# Patient Record
Sex: FEMALE | Race: WHITE | HISPANIC OR LATINO | Employment: OTHER | URBAN - METROPOLITAN AREA
[De-identification: names, ages, dates, MRNs, and addresses within clinical notes are randomized per-mention and may not be internally consistent; named-entity substitution may affect disease eponyms.]

---

## 2020-11-02 ENCOUNTER — HOSPITAL ENCOUNTER (EMERGENCY)
Facility: CLINIC | Age: 75
Discharge: HOME | End: 2020-11-03
Attending: EMERGENCY MEDICINE | Admitting: EMERGENCY MEDICINE
Payer: COMMERCIAL

## 2020-11-02 ENCOUNTER — APPOINTMENT (OUTPATIENT)
Dept: MRI IMAGING | Facility: CLINIC | Age: 75
End: 2020-11-02
Attending: EMERGENCY MEDICINE
Payer: COMMERCIAL

## 2020-11-02 VITALS
RESPIRATION RATE: 16 BRPM | HEART RATE: 91 BPM | OXYGEN SATURATION: 97 % | SYSTOLIC BLOOD PRESSURE: 144 MMHG | DIASTOLIC BLOOD PRESSURE: 74 MMHG | HEIGHT: 62 IN | WEIGHT: 160 LBS | TEMPERATURE: 98.4 F | BODY MASS INDEX: 29.44 KG/M2

## 2020-11-02 DIAGNOSIS — H81.399 PERIPHERAL VERTIGO, UNSPECIFIED LATERALITY: ICD-10-CM

## 2020-11-02 LAB
ANION GAP SERPL CALCULATED.3IONS-SCNC: 7 MMOL/L (ref 3–14)
BASOPHILS # BLD AUTO: 0 10E9/L (ref 0–0.2)
BASOPHILS NFR BLD AUTO: 0.5 %
BUN SERPL-MCNC: 8 MG/DL (ref 7–30)
CALCIUM SERPL-MCNC: 9.7 MG/DL (ref 8.5–10.1)
CHLORIDE SERPL-SCNC: 104 MMOL/L (ref 94–109)
CO2 SERPL-SCNC: 28 MMOL/L (ref 20–32)
CREAT SERPL-MCNC: 0.75 MG/DL (ref 0.52–1.04)
DIFFERENTIAL METHOD BLD: ABNORMAL
EOSINOPHIL # BLD AUTO: 0.1 10E9/L (ref 0–0.7)
EOSINOPHIL NFR BLD AUTO: 2 %
ERYTHROCYTE [DISTWIDTH] IN BLOOD BY AUTOMATED COUNT: 13.8 % (ref 10–15)
GFR SERPL CREATININE-BSD FRML MDRD: 79 ML/MIN/{1.73_M2}
GLUCOSE SERPL-MCNC: 173 MG/DL (ref 70–99)
HCT VFR BLD AUTO: 47.4 % (ref 35–47)
HGB BLD-MCNC: 14.4 G/DL (ref 11.7–15.7)
IMM GRANULOCYTES # BLD: 0 10E9/L (ref 0–0.4)
IMM GRANULOCYTES NFR BLD: 0.2 %
LYMPHOCYTES # BLD AUTO: 1.6 10E9/L (ref 0.8–5.3)
LYMPHOCYTES NFR BLD AUTO: 24.2 %
MCH RBC QN AUTO: 28 PG (ref 26.5–33)
MCHC RBC AUTO-ENTMCNC: 30.4 G/DL (ref 31.5–36.5)
MCV RBC AUTO: 92 FL (ref 78–100)
MONOCYTES # BLD AUTO: 0.4 10E9/L (ref 0–1.3)
MONOCYTES NFR BLD AUTO: 5.4 %
NEUTROPHILS # BLD AUTO: 4.4 10E9/L (ref 1.6–8.3)
NEUTROPHILS NFR BLD AUTO: 67.7 %
NRBC # BLD AUTO: 0 10*3/UL
NRBC BLD AUTO-RTO: 0 /100
PLATELET # BLD AUTO: 247 10E9/L (ref 150–450)
POTASSIUM SERPL-SCNC: 4.4 MMOL/L (ref 3.4–5.3)
RBC # BLD AUTO: 5.15 10E12/L (ref 3.8–5.2)
SODIUM SERPL-SCNC: 139 MMOL/L (ref 133–144)
TROPONIN I SERPL-MCNC: <0.015 UG/L (ref 0–0.04)
WBC # BLD AUTO: 6.5 10E9/L (ref 4–11)

## 2020-11-02 PROCEDURE — 255N000002 HC RX 255 OP 636: Performed by: EMERGENCY MEDICINE

## 2020-11-02 PROCEDURE — 85025 COMPLETE CBC W/AUTO DIFF WBC: CPT | Performed by: EMERGENCY MEDICINE

## 2020-11-02 PROCEDURE — 99285 EMERGENCY DEPT VISIT HI MDM: CPT | Mod: 25

## 2020-11-02 PROCEDURE — 84484 ASSAY OF TROPONIN QUANT: CPT | Performed by: EMERGENCY MEDICINE

## 2020-11-02 PROCEDURE — 70549 MR ANGIOGRAPH NECK W/O&W/DYE: CPT

## 2020-11-02 PROCEDURE — 70544 MR ANGIOGRAPHY HEAD W/O DYE: CPT

## 2020-11-02 PROCEDURE — 70553 MRI BRAIN STEM W/O & W/DYE: CPT

## 2020-11-02 PROCEDURE — 80048 BASIC METABOLIC PNL TOTAL CA: CPT | Performed by: EMERGENCY MEDICINE

## 2020-11-02 PROCEDURE — A9585 GADOBUTROL INJECTION: HCPCS | Performed by: EMERGENCY MEDICINE

## 2020-11-02 RX ORDER — GADOBUTROL 604.72 MG/ML
10 INJECTION INTRAVENOUS ONCE
Status: COMPLETED | OUTPATIENT
Start: 2020-11-02 | End: 2020-11-02

## 2020-11-02 RX ORDER — MECLIZINE HYDROCHLORIDE 25 MG/1
25 TABLET ORAL 3 TIMES DAILY PRN
Qty: 15 TABLET | Refills: 0 | Status: SHIPPED | OUTPATIENT
Start: 2020-11-02 | End: 2020-11-03

## 2020-11-02 RX ADMIN — GADOBUTROL 10 ML: 604.72 INJECTION INTRAVENOUS at 22:20

## 2020-11-02 ASSESSMENT — ENCOUNTER SYMPTOMS
RHINORRHEA: 0
VOMITING: 0
NAUSEA: 0
SHORTNESS OF BREATH: 0
HEADACHES: 0
DIZZINESS: 1

## 2020-11-02 ASSESSMENT — MIFFLIN-ST. JEOR: SCORE: 1184.01

## 2020-11-02 NOTE — ED AVS SNAPSHOT
[facility] Emergency Dept  [address]  [facility] [address]  Phone: [phone]  Fax: [phone]                                    Taylor Anne   MRN: [id number]    Department: [facility] Emergency Dept   Date of Visit: 11/2/2020           After Visit Summary Signature Page    I have received my discharge instructions, and my questions have been answered. I have discussed any challenges I see with this plan with the nurse or doctor.    ..........................................................................................................................................  Patient/Patient Representative Signature      ..........................................................................................................................................  Patient Representative Print Name and Relationship to Patient    ..................................................               ................................................  Date                                   Time    ..........................................................................................................................................  Reviewed by Signature/Title    ...................................................              ..............................................  Date                                               Time          22EPIC Rev 08/18

## 2020-11-03 RX ORDER — MECLIZINE HYDROCHLORIDE 25 MG/1
25 TABLET ORAL 3 TIMES DAILY PRN
Qty: 15 TABLET | Refills: 0 | Status: SHIPPED | OUTPATIENT
Start: 2020-11-03

## 2020-11-03 NOTE — ED TRIAGE NOTES
Pt presents for complaint of dizziness and a headache that has been intermittent throughout the weekend. Pt's home care nurse states that she has complained that the room is spinning around her and her blood pressures have been elevated. Pt otherwise denies other complaints. ABC intact, A&Ox4.  
no nausea/no change in level of consciousness/no blurred vision/no weakness/no dizziness/no numbness/no vomiting/no confusion/no loss of consciousness/no fever

## 2020-11-03 NOTE — ED NOTES
Spoke with DONNELL Jay, at group home and updated on pt's status. Will call her back again once results come back

## 2021-09-02 NOTE — ED PROVIDER NOTES
History     Chief Complaint:  Dizziness    History limited by: patient's broken English and expressive aphasia due to stroke.      Taylor Anne is a 75 year old female who presents with dizziness. Per report of the patient's home care nurse, the patient has had elevated blood pressure, has complained that the room in spinning and of a headache. The patient whenever she transfers from sitting to laying down she feels the room spinning but when she is laying down or sitting still the sensation starts to go away. She denies any congestion, rhinorrhea, vomiting, nausea, headache, chest pain or shortness of breath. Of note, she had a left hemisphere seizure three years ago.    Allergies:  No Known Allergies     Medications:    Miralax  Neurontin  Plavix  Lipitor  Remeron    Past Medical History:    Diabetes  Hypertension  Vitamin D deficiency  Chronic constipation  Stroke  Right hemiparesis  Onychomycosis  Osteopenia  Type 2 diabetes  Hyperlipidemia    Past Surgical History:    Cataract removal    Family History:    Brother: Stroke, Alcoholism  Mother: Hypertension, Stroke    Social History:  The patient was unaccompanied to the ED.  Smoking Status: Never Smoker  Smokeless Tobacco: Never Used  Alcohol Use: Negative  Drug Use: Negative  PCP: Iesha Walls  Marital Status:       Review of Systems   HENT: Negative for congestion and rhinorrhea.    Respiratory: Negative for shortness of breath.    Cardiovascular: Negative for chest pain.   Gastrointestinal: Negative for nausea and vomiting.   Neurological: Positive for dizziness. Negative for headaches.   See HPI all other systems negative.    Physical Exam     Patient Vitals for the past 24 hrs:   BP Temp Temp src Pulse Resp SpO2 Height Weight   11/02/20 2145 -- -- -- -- -- 99 % -- --   11/02/20 2130 (!) 144/74 -- -- 91 -- 99 % -- --   11/02/20 2115 (!) 142/78 -- -- 93 -- 98 % -- --   11/02/20 2100 139/75 -- -- 91 -- 99 % -- --   11/02/20 2045  "125/79 -- -- 96 -- 99 % -- --   11/02/20 2030 (!) 147/78 -- -- 93 -- 100 % -- --   11/02/20 2015 (!) 157/76 -- -- 95 -- 99 % -- --   11/02/20 1909 (!) 170/91 98.4  F (36.9  C) Oral 97 16 97 % 1.575 m (5' 2\") 72.6 kg (160 lb)       Physical Exam  GEN: alert    HEAD: atraumatic    EYES: pupils reactive, extraocular muscles intact, conjunctivae normal, Difficult to tell if she had horizontal nystagmus as she had problems following commands to look to the left, no vertical or rotary nystagmus noted    ENT: TMs normal as are EACs; nares patent; normal posterior pharynx and oral mucosa, fluid behind left ear drum    NECK: no posterior midline tenderness, no meningeal signs, trachea midline     RESPIRATORY: no tachypnea, breath sounds clear to auscultation    CVS: normal S1/S2, no murmurs/rubs/gallops    ABDOMEN: soft, nontender, no masses or organomegaly, no rebound, positive bowel sounds    MUSCULOSKELETAL: no deformities    SKIN: warm and dry, no acute rashes or ulceration, no erythema     NEURO: GCS 15, cranial nerves intact. Motor: hemiparetic right upper and lower extremity, no antigravity movement of right upper or lower extremity at baseline; mild contracture of right upper extremity with muscle wasting noted.  No contracture of right lower.  Bilateral lower extremity muscle wasting (patient is wheel-chair bound).    LYMPH: no lymphadenopathy    Emergency Department Course     Imaging:  Radiology findings were communicated with the patient who voiced understanding of the findings.    MR Head w/o Contrast Angiogram  HEAD MRI:   1.  No discrete mass lesion, hemorrhage or focal area suggestive of acute is ischemia.  2.  No abnormal enhancement.  3.  Mild age-related changes along with stable areas of encephalomalacia involving left cerebellar hemisphere along with the posterior left basal ganglia to left centrum semiovale region.  HEAD MRA:   1.  No discrete vessel occlusion, significant stenosis, aneurysm or high " flow vascular malformation involving the arteries of the [place].  NECK MRA:  1.  Normal configuration of the great vessels off the aortic arch with no significant stenosis of their origins.  2.  No significant stenosis or irregularity involving the arteries of the neck by NASCET criteria.  3.  Right vertebral artery dominant.  Reading per radiology    MR Neck w/o & w Contrast Angiogram  HEAD MRI:   1.  No discrete mass lesion, hemorrhage or focal area suggestive of acute is ischemia.  2.  No abnormal enhancement.  3.  Mild age-related changes along with stable areas of encephalomalacia involving left cerebellar hemisphere along with the posterior left basal ganglia to left centrum semiovale region.  HEAD MRA:   1.  No discrete vessel occlusion, significant stenosis, aneurysm or high flow vascular malformation involving the arteries of the [place].  NECK MRA:  1.  Normal configuration of the great vessels off the aortic arch with no significant stenosis of their origins.  2.  No significant stenosis or irregularity involving the arteries of the neck by NASCET criteria.  3.  Right vertebral artery dominant.  Reading per radiology    MR Brain w/o & w Contrast  HEAD MRI:   1.  No discrete mass lesion, hemorrhage or focal area suggestive of acute is ischemia.  2.  No abnormal enhancement.  3.  Mild age-related changes along with stable areas of encephalomalacia involving left cerebellar hemisphere along with the posterior left basal ganglia to left centrum semiovale region.  HEAD MRA:   1.  No discrete vessel occlusion, significant stenosis, aneurysm or high flow vascular malformation involving the arteries of the [place].  NECK MRA:  1.  Normal configuration of the great vessels off the aortic arch with no significant stenosis of their origins.  2.  No significant stenosis or irregularity involving the arteries of the neck by NASCET criteria.  3.  Right vertebral artery dominant.  Reading per  "radiology     Laboratory:  Laboratory findings were communicated with the patient who voiced understanding of the findings.    CBC: WBC 6.5, HGB 14.4,   BMP: Glucose 173 (H) o/w WNL (Creatinine 0.75)    Troponin (Collected 2028): <0.015    Emergency Department Course:    Past medical records, nursing notes, and vitals reviewed.  2103: I performed an exam of the patient and obtained history, as documented above.     IV was inserted and blood was drawn for laboratory testing, results above.    The patient was sent for a MR while in the emergency department, findings above    I rechecked and updated the patient.     I personally reviewed the laboratory and imaging results with the Patient and answered all related questions prior to discharge.     Findings and plan explained to the Patient. Patient discharged home with instructions regarding supportive care, medications, and reasons to return. The importance of close follow-up was reviewed.     Impression & Plan      Medical Decision Making:  Taylor Anne is a 75 year old female who presents to the emergency department today for evaluation of vertigo and headache.  Unfortunately the history was very difficult as the patient speaks \"broken English\" and I think she also has expressive aphasia due to a severe left hemispheric MCA several years ago.  However on exam there does not appear to be other focal deficits other than her baseline right hemiparesis.    On further evaluation it is noted that her vertigo seems to be present mainly when she transfers positions from sitting to lying or vice versa.  It resolves within seconds of laying flat.  This was not clear initially because her broken English and expressive aphasia.  She states English is her primary language    At this point I am going to treat her for benign positional vertigo with meclizine and have her follow-up with her primary care physician.          Diagnosis:    ICD-10-CM    1. Peripheral " vertigo, unspecified laterality  H81.399        Disposition:   The patient is discharged to home.    Discharge Medications:  New Prescriptions    MECLIZINE (ANTIVERT) 25 MG TABLET    Take 1 tablet (25 mg) by mouth 3 times daily as needed for dizziness       Scribe Disclosure:  I, Jovanni Rivas, am serving as a scribe at 8:29 PM on 11/2/2020 to document services personally performed by Gregorio Delcid MD based on my observations and the provider's statements to me.  [facility] EMERGENCY DEPT       Gregorio Delcid MD  11/03/20 0019     Detail Level: Simple

## 2022-11-27 ENCOUNTER — APPOINTMENT (OUTPATIENT)
Dept: MRI IMAGING | Facility: CLINIC | Age: 77
End: 2022-11-27
Attending: EMERGENCY MEDICINE
Payer: COMMERCIAL

## 2022-11-27 ENCOUNTER — HOSPITAL ENCOUNTER (EMERGENCY)
Facility: CLINIC | Age: 77
Discharge: HOME | End: 2022-11-27
Attending: EMERGENCY MEDICINE | Admitting: EMERGENCY MEDICINE
Payer: COMMERCIAL

## 2022-11-27 VITALS
HEART RATE: 93 BPM | SYSTOLIC BLOOD PRESSURE: 134 MMHG | RESPIRATION RATE: 16 BRPM | OXYGEN SATURATION: 99 % | DIASTOLIC BLOOD PRESSURE: 78 MMHG | TEMPERATURE: 98.6 F

## 2022-11-27 DIAGNOSIS — R42 VERTIGO: ICD-10-CM

## 2022-11-27 DIAGNOSIS — R51.9 NONINTRACTABLE HEADACHE, UNSPECIFIED CHRONICITY PATTERN, UNSPECIFIED HEADACHE TYPE: ICD-10-CM

## 2022-11-27 LAB
ANION GAP SERPL CALCULATED.3IONS-SCNC: 10 MMOL/L (ref 7–15)
BASOPHILS # BLD AUTO: 0 10E3/UL (ref 0–0.2)
BASOPHILS NFR BLD AUTO: 0 %
BUN SERPL-MCNC: 8.2 MG/DL (ref 8–23)
CALCIUM SERPL-MCNC: 9.6 MG/DL (ref 8.8–10.2)
CHLORIDE SERPL-SCNC: 101 MMOL/L (ref 98–107)
CREAT SERPL-MCNC: 0.61 MG/DL (ref 0.51–0.95)
DEPRECATED HCO3 PLAS-SCNC: 24 MMOL/L (ref 22–29)
EOSINOPHIL # BLD AUTO: 0.2 10E3/UL (ref 0–0.7)
EOSINOPHIL NFR BLD AUTO: 2 %
ERYTHROCYTE [DISTWIDTH] IN BLOOD BY AUTOMATED COUNT: 13.6 % (ref 10–15)
GFR SERPL CREATININE-BSD FRML MDRD: >90 ML/MIN/1.73M2
GLUCOSE SERPL-MCNC: 81 MG/DL (ref 70–99)
HCT VFR BLD AUTO: 44.1 % (ref 35–47)
HGB BLD-MCNC: 13.5 G/DL (ref 11.7–15.7)
IMM GRANULOCYTES # BLD: 0 10E3/UL
IMM GRANULOCYTES NFR BLD: 0 %
INR PPP: 0.93 (ref 0.85–1.15)
LYMPHOCYTES # BLD AUTO: 1.8 10E3/UL (ref 0.8–5.3)
LYMPHOCYTES NFR BLD AUTO: 19 %
MCH RBC QN AUTO: 28.5 PG (ref 26.5–33)
MCHC RBC AUTO-ENTMCNC: 30.6 G/DL (ref 31.5–36.5)
MCV RBC AUTO: 93 FL (ref 78–100)
MONOCYTES # BLD AUTO: 0.4 10E3/UL (ref 0–1.3)
MONOCYTES NFR BLD AUTO: 4 %
NEUTROPHILS # BLD AUTO: 7.3 10E3/UL (ref 1.6–8.3)
NEUTROPHILS NFR BLD AUTO: 75 %
NRBC # BLD AUTO: 0 10E3/UL
NRBC BLD AUTO-RTO: 0 /100
PLATELET # BLD AUTO: 249 10E3/UL (ref 150–450)
POTASSIUM SERPL-SCNC: 5.8 MMOL/L (ref 3.4–5.3)
RBC # BLD AUTO: 4.74 10E6/UL (ref 3.8–5.2)
SODIUM SERPL-SCNC: 135 MMOL/L (ref 136–145)
TROPONIN T SERPL HS-MCNC: 11 NG/L
WBC # BLD AUTO: 9.8 10E3/UL (ref 4–11)

## 2022-11-27 PROCEDURE — 70553 MRI BRAIN STEM W/O & W/DYE: CPT

## 2022-11-27 PROCEDURE — 93005 ELECTROCARDIOGRAM TRACING: CPT

## 2022-11-27 PROCEDURE — 99285 EMERGENCY DEPT VISIT HI MDM: CPT | Mod: 25

## 2022-11-27 PROCEDURE — A9585 GADOBUTROL INJECTION: HCPCS | Performed by: EMERGENCY MEDICINE

## 2022-11-27 PROCEDURE — 255N000002 HC RX 255 OP 636: Performed by: EMERGENCY MEDICINE

## 2022-11-27 PROCEDURE — 85025 COMPLETE CBC W/AUTO DIFF WBC: CPT | Performed by: EMERGENCY MEDICINE

## 2022-11-27 PROCEDURE — 84484 ASSAY OF TROPONIN QUANT: CPT | Performed by: EMERGENCY MEDICINE

## 2022-11-27 PROCEDURE — 82374 ASSAY BLOOD CARBON DIOXIDE: CPT | Performed by: EMERGENCY MEDICINE

## 2022-11-27 PROCEDURE — 85610 PROTHROMBIN TIME: CPT | Performed by: EMERGENCY MEDICINE

## 2022-11-27 PROCEDURE — 250N000013 HC RX MED GY IP 250 OP 250 PS 637: Performed by: EMERGENCY MEDICINE

## 2022-11-27 PROCEDURE — 36415 COLL VENOUS BLD VENIPUNCTURE: CPT | Performed by: EMERGENCY MEDICINE

## 2022-11-27 RX ORDER — MECLIZINE HYDROCHLORIDE 25 MG/1
25 TABLET ORAL 3 TIMES DAILY PRN
Qty: 10 TABLET | Refills: 0 | Status: SHIPPED | OUTPATIENT
Start: 2022-11-27

## 2022-11-27 RX ORDER — MECLIZINE HYDROCHLORIDE 25 MG/1
25 TABLET ORAL ONCE
Status: COMPLETED | OUTPATIENT
Start: 2022-11-27 | End: 2022-11-27

## 2022-11-27 RX ORDER — GADOBUTROL 604.72 MG/ML
7 INJECTION INTRAVENOUS ONCE
Status: COMPLETED | OUTPATIENT
Start: 2022-11-27 | End: 2022-11-27

## 2022-11-27 RX ADMIN — GADOBUTROL 7 ML: 604.72 INJECTION INTRAVENOUS at 20:27

## 2022-11-27 RX ADMIN — MECLIZINE HYDROCHLORIDE 25 MG: 25 TABLET ORAL at 19:52

## 2022-11-27 ASSESSMENT — ENCOUNTER SYMPTOMS
HEADACHES: 1
NAUSEA: 0
DIZZINESS: 1

## 2022-11-27 ASSESSMENT — ACTIVITIES OF DAILY LIVING (ADL)
ADLS_ACUITY_SCORE: 33
ADLS_ACUITY_SCORE: 35

## 2022-11-27 NOTE — ED TRIAGE NOTES
Pt complains of headache and dizziness  For 36 hours.  No n/v     Triage Assessment     Row Name 11/27/22 2619       Triage Assessment (Adult)    Airway WDL WDL       Respiratory WDL    Respiratory WDL WDL

## 2022-11-28 LAB
ATRIAL RATE - MUSE: 90 BPM
DIASTOLIC BLOOD PRESSURE - MUSE: NORMAL MMHG
INTERPRETATION ECG - MUSE: NORMAL
P AXIS - MUSE: 53 DEGREES
PR INTERVAL - MUSE: 162 MS
QRS DURATION - MUSE: 70 MS
QT - MUSE: 348 MS
QTC - MUSE: 425 MS
R AXIS - MUSE: -18 DEGREES
SYSTOLIC BLOOD PRESSURE - MUSE: NORMAL MMHG
T AXIS - MUSE: 69 DEGREES
VENTRICULAR RATE- MUSE: 90 BPM

## 2022-11-28 NOTE — ED PROVIDER NOTES
PIT/Triage Evaluation    Taylor Anne is a 77 year old female on Plavix (according an office visit med refill on 11/15/22) for history of CVA with diabetes mellitus who presents with an intermittent headache accompanied by dizziness she describes as room spinning for the past two days. Upon evaluation, she has no headache, but her vertigo is present. She denies vision or speech changes. She denies nausea or vomiting. She has never had issues with vertigo before.    Exam is notable for well-appearing [place] woman sitting in her own wheelchair.  No facial droop.  Paralysis of the right upper extremity at baseline.  Normal strength of the left upper extremity.  Grossly normal strength of the bilateral lower extremities.    Appropriate interventions for symptom management were initiated if applicable.  Appropriate diagnostic tests were initiated if indicated.    Important information for subsequent clinician: MRI ordered for elderly woman with risk factors and room spinning vertigo.  Prior stroke.  On Plavix.  EKG reassuring.  Labs ordered.  Meclizine for symptoms.    I briefly evaluated the patient and developed an initial plan of care. I discussed this plan and explained that this brief interaction does not constitute a full evaluation. Patient/family understands that they should wait to be fully evaluated and discuss any test results with another clinician prior to leaving the hospital.       Caridad Farfan MD  11/27/22 1930

## 2022-11-28 NOTE — ED PROVIDER NOTES
History   Chief Complaint:  Headache and Dizziness     The history is provided by the patient.      Taylor Anne is a 77 year old female with history of hypertension, hyperlipidemia, DM2, and stroke who presents with headache and dizziness. Patient reports experiencing spinning dizziness for the last 3 days. She also endorses a headache. She denies nausea. She reports being seen in the Urgent Care this morning and referred to the ED.     Review of Systems   Gastrointestinal: Negative for nausea.   Neurological: Positive for dizziness and headaches.   All other systems reviewed and are negative.      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    Mouth/Throat: Oropharynx is clear, mucous membranes are moist  Eyes: EOM are normal. Pupils are equal, round, and reactive to light.   CV: Regular rate and rhythm, no murmurs, rubs or gallops.  Chest: Effort normal and breath sounds normal.   GI: No distension. There is no tenderness  MSK: Normal range of motion.   Neurological:   A/Ox3;   Cranial nerves 2-12 intact;   5/5 strength throughout the upper and lower  extremities;   sensation intact to light touch throughout the upper and lower extremities;   normal gait   Skin: Skin is warm and dry.        Emergency Department Course   ECG  ECG results from 11/27/22   EKG 12-lead, tracing only     Value    Systolic Blood Pressure     Diastolic Blood Pressure     Ventricular Rate 90    Atrial Rate 90    OR Interval 162    QRS Duration 70        QTc 425    P Axis 53    R AXIS -18    T Axis 69    Interpretation ECG      Sinus rhythm  Low voltage QRS  Borderline ECG  No previous ECGs available         Imaging:  MR Brain w/o & w Contrast   Final Result   IMPRESSION:   1.  No acute intracranial process.   2.  Chronic findings are not significantly changed since 11/02/2020.        Report per radiology    Laboratory:  Labs Ordered and Resulted from Time of ED Arrival to Time of ED Departure   BASIC METABOLIC PANEL - Abnormal       Result Value    Sodium 135 (*)     Potassium 5.8 (*)     Chloride 101      Carbon Dioxide (CO2) 24      Anion Gap 10      Urea Nitrogen 8.2      Creatinine 0.61      Calcium 9.6      Glucose 81      GFR Estimate >90     CBC WITH PLATELETS AND DIFFERENTIAL - Abnormal    WBC Count 9.8      RBC Count 4.74      Hemoglobin 13.5      Hematocrit 44.1      MCV 93      MCH 28.5      MCHC 30.6 (*)     RDW 13.6      Platelet Count 249      % Neutrophils 75      % Lymphocytes 19      % Monocytes 4      % Eosinophils 2      % Basophils 0      % Immature Granulocytes 0      NRBCs per 100 WBC 0      Absolute Neutrophils 7.3      Absolute Lymphocytes 1.8      Absolute Monocytes 0.4      Absolute Eosinophils 0.2      Absolute Basophils 0.0      Absolute Immature Granulocytes 0.0      Absolute NRBCs 0.0     TROPONIN T, HIGH SENSITIVITY - Normal    Troponin T, High Sensitivity 11     INR - Normal    INR 0.93        Emergency Department Course:     Reviewed:  I reviewed nursing notes, vitals, past medical history and Care Everywhere    Assessments:  2148 I obtained history  and examined the patient as noted above.     Interventions:  1952 Meclizine 25mg PO  2027 Gadobutrol 7mL IV    Disposition:  The patient was discharged to home.     Impression & Plan   Medical Decision Making:  This is a 77-year-old female with history of hypertension, hyperlipidemia, diabetes, stroke, who presents relation headache and dizziness.  Given persistent symptoms, headache, and multiple risk factors, the concern exist for possible cerebellar stroke.  Screening labs show no hematologic or metabolic abnormality to explain symptoms.  Fortunately, MRI brain is negative for acute pathology.  No fever or thunderclap to indicate concern for meningitis or subarachnoid hemorrhage, or to indicate LP.  Plan For supportive cares at home, likely peripheral vertigo.  Return precautions for intractable dizziness, headache, neck pain, or any other concerns.  Primary care follow-up in 3 to 5 days.    Diagnosis:    ICD-10-CM    1. Vertigo  R42       2. Nonintractable headache, unspecified chronicity pattern, unspecified headache type  R51.9           Discharge Medications:  New Prescriptions    MECLIZINE (ANTIVERT) 25 MG TABLET    Take 1 tablet (25 mg) by mouth 3 times daily as needed for dizziness       Scribe Disclosure:  I, Diane Georgi, am serving as a scribe at 9:39 PM on 11/27/2022 to document services personally performed by Phan Horn MD based on my observations and the provider's statements to me.          Phan Horn MD  01/19/23 1139

## 2023-12-28 ENCOUNTER — APPOINTMENT (OUTPATIENT)
Dept: GENERAL RADIOLOGY | Facility: CLINIC | Age: 78
DRG: 853 | End: 2023-12-28
Attending: EMERGENCY MEDICINE
Payer: COMMERCIAL

## 2023-12-28 ENCOUNTER — ANESTHESIA (OUTPATIENT)
Dept: SURGERY | Facility: CLINIC | Age: 78
DRG: 853 | End: 2023-12-28
Payer: COMMERCIAL

## 2023-12-28 ENCOUNTER — HOSPITAL ENCOUNTER (INPATIENT)
Facility: CLINIC | Age: 78
LOS: 9 days | Discharge: NURSING, REHAB OR LONG-TERM CARE | DRG: 853 | End: 2024-01-06
Attending: EMERGENCY MEDICINE | Admitting: INTERNAL MEDICINE
Payer: COMMERCIAL

## 2023-12-28 ENCOUNTER — APPOINTMENT (OUTPATIENT)
Dept: CT IMAGING | Facility: CLINIC | Age: 78
DRG: 853 | End: 2023-12-28
Attending: EMERGENCY MEDICINE
Payer: COMMERCIAL

## 2023-12-28 ENCOUNTER — ANESTHESIA EVENT (OUTPATIENT)
Dept: SURGERY | Facility: CLINIC | Age: 78
DRG: 853 | End: 2023-12-28
Payer: COMMERCIAL

## 2023-12-28 DIAGNOSIS — K83.09 ASCENDING CHOLANGITIS (H): ICD-10-CM

## 2023-12-28 DIAGNOSIS — K85.10 GALLSTONE PANCREATITIS: ICD-10-CM

## 2023-12-28 DIAGNOSIS — R65.21 SEPTIC SHOCK (H): ICD-10-CM

## 2023-12-28 DIAGNOSIS — A41.9 SEPTIC SHOCK (H): ICD-10-CM

## 2023-12-28 DIAGNOSIS — K80.50 CHOLEDOCHOLITHIASIS: ICD-10-CM

## 2023-12-28 DIAGNOSIS — F32.1 CURRENT MODERATE EPISODE OF MAJOR DEPRESSIVE DISORDER WITHOUT PRIOR EPISODE (H): Primary | ICD-10-CM

## 2023-12-28 LAB
ALBUMIN SERPL BCG-MCNC: 4.2 G/DL (ref 3.5–5.2)
ALBUMIN UR-MCNC: 20 MG/DL
ALP SERPL-CCNC: 206 U/L (ref 40–150)
ALT SERPL W P-5'-P-CCNC: 780 U/L (ref 0–50)
AMMONIA PLAS-SCNC: 13 UMOL/L (ref 11–51)
ANION GAP SERPL CALCULATED.3IONS-SCNC: 17 MMOL/L (ref 7–15)
APPEARANCE UR: CLEAR
AST SERPL W P-5'-P-CCNC: 582 U/L (ref 0–45)
B-OH-BUTYR SERPL-SCNC: 0.3 MMOL/L
BACTERIA #/AREA URNS HPF: ABNORMAL /HPF
BASOPHILS # BLD AUTO: 0 10E3/UL (ref 0–0.2)
BASOPHILS NFR BLD AUTO: 0 %
BILIRUB SERPL-MCNC: 4.1 MG/DL
BILIRUB UR QL STRIP: NEGATIVE
BUN SERPL-MCNC: 20.4 MG/DL (ref 8–23)
CALCIUM SERPL-MCNC: 9.5 MG/DL (ref 8.8–10.2)
CHLORIDE SERPL-SCNC: 92 MMOL/L (ref 98–107)
COLOR UR AUTO: YELLOW
CREAT SERPL-MCNC: 1.01 MG/DL (ref 0.51–0.95)
D DIMER PPP FEU-MCNC: 12.68 UG/ML FEU (ref 0–0.5)
DEPRECATED HCO3 PLAS-SCNC: 22 MMOL/L (ref 22–29)
EGFRCR SERPLBLD CKD-EPI 2021: 57 ML/MIN/1.73M2
EOSINOPHIL # BLD AUTO: 0 10E3/UL (ref 0–0.7)
EOSINOPHIL NFR BLD AUTO: 0 %
ERCP: NORMAL
ERYTHROCYTE [DISTWIDTH] IN BLOOD BY AUTOMATED COUNT: 13.8 % (ref 10–15)
ETHANOL SERPL-MCNC: <0.01 G/DL
FLUAV RNA SPEC QL NAA+PROBE: NEGATIVE
FLUBV RNA RESP QL NAA+PROBE: NEGATIVE
GLUCOSE BLDC GLUCOMTR-MCNC: 102 MG/DL (ref 70–99)
GLUCOSE BLDC GLUCOMTR-MCNC: 168 MG/DL (ref 70–99)
GLUCOSE BLDC GLUCOMTR-MCNC: 83 MG/DL (ref 70–99)
GLUCOSE SERPL-MCNC: 181 MG/DL (ref 70–99)
GLUCOSE UR STRIP-MCNC: NEGATIVE MG/DL
HBA1C MFR BLD: 6.3 %
HCO3 BLDV-SCNC: 23 MMOL/L (ref 21–28)
HCO3 BLDV-SCNC: 24 MMOL/L (ref 21–28)
HCT VFR BLD AUTO: 42.7 % (ref 35–47)
HGB BLD-MCNC: 14.2 G/DL (ref 11.7–15.7)
HGB UR QL STRIP: ABNORMAL
HOLD SPECIMEN: NORMAL
IMM GRANULOCYTES # BLD: 0.3 10E3/UL
IMM GRANULOCYTES NFR BLD: 2 %
KETONES UR STRIP-MCNC: NEGATIVE MG/DL
LACTATE BLD-SCNC: 4 MMOL/L
LACTATE BLD-SCNC: 4.2 MMOL/L
LACTATE SERPL-SCNC: 4 MMOL/L (ref 0.7–2)
LEUKOCYTE ESTERASE UR QL STRIP: NEGATIVE
LIPASE SERPL-CCNC: 1548 U/L (ref 13–60)
LYMPHOCYTES # BLD AUTO: 0.4 10E3/UL (ref 0.8–5.3)
LYMPHOCYTES NFR BLD AUTO: 2 %
MAGNESIUM SERPL-MCNC: 1.6 MG/DL (ref 1.7–2.3)
MCH RBC QN AUTO: 29 PG (ref 26.5–33)
MCHC RBC AUTO-ENTMCNC: 33.3 G/DL (ref 31.5–36.5)
MCV RBC AUTO: 87 FL (ref 78–100)
MONOCYTES # BLD AUTO: 0.3 10E3/UL (ref 0–1.3)
MONOCYTES NFR BLD AUTO: 2 %
NEUTROPHILS # BLD AUTO: 15.1 10E3/UL (ref 1.6–8.3)
NEUTROPHILS NFR BLD AUTO: 94 %
NITRATE UR QL: NEGATIVE
NRBC # BLD AUTO: 0 10E3/UL
NRBC BLD AUTO-RTO: 0 /100
PCO2 BLDV: 37 MM HG (ref 40–50)
PCO2 BLDV: 41 MM HG (ref 40–50)
PH BLDV: 7.35 [PH] (ref 7.32–7.43)
PH BLDV: 7.42 [PH] (ref 7.32–7.43)
PH UR STRIP: 7 [PH] (ref 5–7)
PLATELET # BLD AUTO: 263 10E3/UL (ref 150–450)
PO2 BLDV: 15 MM HG (ref 25–47)
PO2 BLDV: 20 MM HG (ref 25–47)
POTASSIUM SERPL-SCNC: 4.5 MMOL/L (ref 3.4–5.3)
PROCALCITONIN SERPL IA-MCNC: 2.59 NG/ML
PROT SERPL-MCNC: 7.9 G/DL (ref 6.4–8.3)
RBC # BLD AUTO: 4.89 10E6/UL (ref 3.8–5.2)
RBC URINE: 2 /HPF
RSV RNA SPEC NAA+PROBE: NEGATIVE
SAO2 % BLDV: 18 % (ref 94–100)
SAO2 % BLDV: 35 % (ref 94–100)
SARS-COV-2 RNA RESP QL NAA+PROBE: NEGATIVE
SODIUM SERPL-SCNC: 131 MMOL/L (ref 135–145)
SP GR UR STRIP: 1.01 (ref 1–1.03)
UROBILINOGEN UR STRIP-MCNC: 2 MG/DL
WBC # BLD AUTO: 16 10E3/UL (ref 4–11)
WBC URINE: 6 /HPF

## 2023-12-28 PROCEDURE — 85025 COMPLETE CBC W/AUTO DIFF WBC: CPT | Performed by: EMERGENCY MEDICINE

## 2023-12-28 PROCEDURE — 258N000003 HC RX IP 258 OP 636: Performed by: INTERNAL MEDICINE

## 2023-12-28 PROCEDURE — 83036 HEMOGLOBIN GLYCOSYLATED A1C: CPT | Performed by: INTERNAL MEDICINE

## 2023-12-28 PROCEDURE — 82140 ASSAY OF AMMONIA: CPT | Performed by: EMERGENCY MEDICINE

## 2023-12-28 PROCEDURE — 272N000001 HC OR GENERAL SUPPLY STERILE: Performed by: INTERNAL MEDICINE

## 2023-12-28 PROCEDURE — 250N000013 HC RX MED GY IP 250 OP 250 PS 637: Performed by: INTERNAL MEDICINE

## 2023-12-28 PROCEDURE — 250N000009 HC RX 250: Performed by: INTERNAL MEDICINE

## 2023-12-28 PROCEDURE — 250N000011 HC RX IP 250 OP 636: Performed by: EMERGENCY MEDICINE

## 2023-12-28 PROCEDURE — 250N000025 HC SEVOFLURANE, PER MIN: Performed by: INTERNAL MEDICINE

## 2023-12-28 PROCEDURE — 250N000009 HC RX 250: Performed by: NURSE ANESTHETIST, CERTIFIED REGISTERED

## 2023-12-28 PROCEDURE — 99223 1ST HOSP IP/OBS HIGH 75: CPT | Performed by: INTERNAL MEDICINE

## 2023-12-28 PROCEDURE — 84145 PROCALCITONIN (PCT): CPT | Performed by: EMERGENCY MEDICINE

## 2023-12-28 PROCEDURE — 370N000017 HC ANESTHESIA TECHNICAL FEE, PER MIN: Performed by: INTERNAL MEDICINE

## 2023-12-28 PROCEDURE — 93005 ELECTROCARDIOGRAM TRACING: CPT

## 2023-12-28 PROCEDURE — 250N000009 HC RX 250: Performed by: EMERGENCY MEDICINE

## 2023-12-28 PROCEDURE — 82962 GLUCOSE BLOOD TEST: CPT

## 2023-12-28 PROCEDURE — C1769 GUIDE WIRE: HCPCS | Performed by: INTERNAL MEDICINE

## 2023-12-28 PROCEDURE — 87186 SC STD MICRODIL/AGAR DIL: CPT | Performed by: EMERGENCY MEDICINE

## 2023-12-28 PROCEDURE — 36415 COLL VENOUS BLD VENIPUNCTURE: CPT

## 2023-12-28 PROCEDURE — 87149 DNA/RNA DIRECT PROBE: CPT | Performed by: EMERGENCY MEDICINE

## 2023-12-28 PROCEDURE — 71275 CT ANGIOGRAPHY CHEST: CPT

## 2023-12-28 PROCEDURE — 82010 KETONE BODYS QUAN: CPT | Performed by: EMERGENCY MEDICINE

## 2023-12-28 PROCEDURE — 83735 ASSAY OF MAGNESIUM: CPT | Performed by: EMERGENCY MEDICINE

## 2023-12-28 PROCEDURE — 99291 CRITICAL CARE FIRST HOUR: CPT | Mod: 25

## 2023-12-28 PROCEDURE — 82803 BLOOD GASES ANY COMBINATION: CPT

## 2023-12-28 PROCEDURE — 255N000002 HC RX 255 OP 636: Performed by: INTERNAL MEDICINE

## 2023-12-28 PROCEDURE — 83605 ASSAY OF LACTIC ACID: CPT

## 2023-12-28 PROCEDURE — 0F798DZ DILATION OF COMMON BILE DUCT WITH INTRALUMINAL DEVICE, VIA NATURAL OR ARTIFICIAL OPENING ENDOSCOPIC: ICD-10-PCS | Performed by: INTERNAL MEDICINE

## 2023-12-28 PROCEDURE — C2617 STENT, NON-COR, TEM W/O DEL: HCPCS | Performed by: INTERNAL MEDICINE

## 2023-12-28 PROCEDURE — C1894 INTRO/SHEATH, NON-LASER: HCPCS | Performed by: INTERNAL MEDICINE

## 2023-12-28 PROCEDURE — 83690 ASSAY OF LIPASE: CPT | Performed by: EMERGENCY MEDICINE

## 2023-12-28 PROCEDURE — 999N000141 HC STATISTIC PRE-PROCEDURE NURSING ASSESSMENT: Performed by: INTERNAL MEDICINE

## 2023-12-28 PROCEDURE — 36415 COLL VENOUS BLD VENIPUNCTURE: CPT | Performed by: EMERGENCY MEDICINE

## 2023-12-28 PROCEDURE — 82077 ASSAY SPEC XCP UR&BREATH IA: CPT | Performed by: EMERGENCY MEDICINE

## 2023-12-28 PROCEDURE — 258N000003 HC RX IP 258 OP 636: Performed by: EMERGENCY MEDICINE

## 2023-12-28 PROCEDURE — 360N000082 HC SURGERY LEVEL 2 W/ FLUORO, PER MIN: Performed by: INTERNAL MEDICINE

## 2023-12-28 PROCEDURE — 258N000003 HC RX IP 258 OP 636: Performed by: NURSE ANESTHETIST, CERTIFIED REGISTERED

## 2023-12-28 PROCEDURE — 80053 COMPREHEN METABOLIC PANEL: CPT | Performed by: EMERGENCY MEDICINE

## 2023-12-28 PROCEDURE — 250N000011 HC RX IP 250 OP 636: Performed by: NURSE ANESTHETIST, CERTIFIED REGISTERED

## 2023-12-28 PROCEDURE — 250N000011 HC RX IP 250 OP 636: Performed by: INTERNAL MEDICINE

## 2023-12-28 PROCEDURE — 87637 SARSCOV2&INF A&B&RSV AMP PRB: CPT | Performed by: EMERGENCY MEDICINE

## 2023-12-28 PROCEDURE — 999N000179 XR SURGERY CARM FLUORO LESS THAN 5 MIN W STILLS: Mod: TC

## 2023-12-28 PROCEDURE — 200N000001 HC R&B ICU

## 2023-12-28 PROCEDURE — 81001 URINALYSIS AUTO W/SCOPE: CPT | Performed by: EMERGENCY MEDICINE

## 2023-12-28 PROCEDURE — 93005 ELECTROCARDIOGRAM TRACING: CPT | Mod: 76

## 2023-12-28 PROCEDURE — 85379 FIBRIN DEGRADATION QUANT: CPT | Performed by: EMERGENCY MEDICINE

## 2023-12-28 DEVICE — STENT COTTON LEUNG (AMSTERDAM) BIL 10FRX07CM CLSO-10-7: Type: IMPLANTABLE DEVICE | Site: BILE DUCT | Status: FUNCTIONAL

## 2023-12-28 RX ORDER — ALBUTEROL SULFATE 0.83 MG/ML
2.5 SOLUTION RESPIRATORY (INHALATION) EVERY 4 HOURS PRN
Status: DISCONTINUED | OUTPATIENT
Start: 2023-12-28 | End: 2024-01-01 | Stop reason: HOSPADM

## 2023-12-28 RX ORDER — ONDANSETRON 4 MG/1
4 TABLET, ORALLY DISINTEGRATING ORAL EVERY 30 MIN PRN
Status: CANCELLED | OUTPATIENT
Start: 2023-12-28

## 2023-12-28 RX ORDER — PROCHLORPERAZINE MALEATE 5 MG
5 TABLET ORAL EVERY 6 HOURS PRN
Status: DISCONTINUED | OUTPATIENT
Start: 2023-12-28 | End: 2024-01-06 | Stop reason: HOSPADM

## 2023-12-28 RX ORDER — ONDANSETRON 4 MG/1
4 TABLET, ORALLY DISINTEGRATING ORAL EVERY 6 HOURS PRN
Status: CANCELLED | OUTPATIENT
Start: 2023-12-28

## 2023-12-28 RX ORDER — CLOPIDOGREL BISULFATE 75 MG/1
75 TABLET ORAL DAILY
COMMUNITY

## 2023-12-28 RX ORDER — ROPIVACAINE IN 0.9% SOD CHL/PF 0.1 %
.03-.125 PLASTIC BAG, INJECTION (ML) EPIDURAL CONTINUOUS
Status: DISCONTINUED | OUTPATIENT
Start: 2023-12-28 | End: 2023-12-29

## 2023-12-28 RX ORDER — ONDANSETRON 4 MG/1
4 TABLET, ORALLY DISINTEGRATING ORAL EVERY 6 HOURS PRN
Status: DISCONTINUED | OUTPATIENT
Start: 2023-12-28 | End: 2024-01-06 | Stop reason: HOSPADM

## 2023-12-28 RX ORDER — MIRTAZAPINE 15 MG/1
30 TABLET, FILM COATED ORAL AT BEDTIME
Status: DISCONTINUED | OUTPATIENT
Start: 2023-12-28 | End: 2024-01-06 | Stop reason: HOSPADM

## 2023-12-28 RX ORDER — DOCUSATE SODIUM 100 MG/1
100 CAPSULE, LIQUID FILLED ORAL 2 TIMES DAILY PRN
COMMUNITY

## 2023-12-28 RX ORDER — NICOTINE POLACRILEX 4 MG
15-30 LOZENGE BUCCAL
Status: DISCONTINUED | OUTPATIENT
Start: 2023-12-28 | End: 2023-12-28

## 2023-12-28 RX ORDER — SODIUM CHLORIDE 9 MG/ML
INJECTION, SOLUTION INTRAVENOUS CONTINUOUS
Status: DISCONTINUED | OUTPATIENT
Start: 2023-12-28 | End: 2023-12-30

## 2023-12-28 RX ORDER — PROCHLORPERAZINE 25 MG
12.5 SUPPOSITORY, RECTAL RECTAL EVERY 12 HOURS PRN
Status: DISCONTINUED | OUTPATIENT
Start: 2023-12-28 | End: 2024-01-06 | Stop reason: HOSPADM

## 2023-12-28 RX ORDER — NALOXONE HYDROCHLORIDE 0.4 MG/ML
0.2 INJECTION, SOLUTION INTRAMUSCULAR; INTRAVENOUS; SUBCUTANEOUS
Status: CANCELLED | OUTPATIENT
Start: 2023-12-28

## 2023-12-28 RX ORDER — TRAVOPROST OPHTHALMIC SOLUTION 0.04 MG/ML
1 SOLUTION OPHTHALMIC AT BEDTIME
Status: DISCONTINUED | OUTPATIENT
Start: 2023-12-28 | End: 2024-01-06 | Stop reason: HOSPADM

## 2023-12-28 RX ORDER — DIPHENHYDRAMINE HCL 12.5MG/5ML
12.5 LIQUID (ML) ORAL EVERY 6 HOURS PRN
Status: DISCONTINUED | OUTPATIENT
Start: 2023-12-28 | End: 2023-12-28

## 2023-12-28 RX ORDER — FENTANYL CITRATE 50 UG/ML
INJECTION, SOLUTION INTRAMUSCULAR; INTRAVENOUS PRN
Status: DISCONTINUED | OUTPATIENT
Start: 2023-12-28 | End: 2023-12-28

## 2023-12-28 RX ORDER — LABETALOL HYDROCHLORIDE 5 MG/ML
10 INJECTION, SOLUTION INTRAVENOUS
Status: DISCONTINUED | OUTPATIENT
Start: 2023-12-28 | End: 2023-12-28

## 2023-12-28 RX ORDER — ONDANSETRON 2 MG/ML
4 INJECTION INTRAMUSCULAR; INTRAVENOUS EVERY 6 HOURS PRN
Status: DISCONTINUED | OUTPATIENT
Start: 2023-12-28 | End: 2024-01-06 | Stop reason: HOSPADM

## 2023-12-28 RX ORDER — TRAVOPROST OPHTHALMIC SOLUTION 0.04 MG/ML
1 SOLUTION OPHTHALMIC AT BEDTIME
COMMUNITY

## 2023-12-28 RX ORDER — SODIUM CHLORIDE, SODIUM LACTATE, POTASSIUM CHLORIDE, CALCIUM CHLORIDE 600; 310; 30; 20 MG/100ML; MG/100ML; MG/100ML; MG/100ML
INJECTION, SOLUTION INTRAVENOUS CONTINUOUS
Status: DISCONTINUED | OUTPATIENT
Start: 2023-12-28 | End: 2023-12-28

## 2023-12-28 RX ORDER — SODIUM CHLORIDE, SODIUM LACTATE, POTASSIUM CHLORIDE, CALCIUM CHLORIDE 600; 310; 30; 20 MG/100ML; MG/100ML; MG/100ML; MG/100ML
INJECTION, SOLUTION INTRAVENOUS CONTINUOUS PRN
Status: DISCONTINUED | OUTPATIENT
Start: 2023-12-28 | End: 2023-12-28

## 2023-12-28 RX ORDER — NALOXONE HYDROCHLORIDE 0.4 MG/ML
0.4 INJECTION, SOLUTION INTRAMUSCULAR; INTRAVENOUS; SUBCUTANEOUS
Status: CANCELLED | OUTPATIENT
Start: 2023-12-28

## 2023-12-28 RX ORDER — LIDOCAINE 40 MG/G
CREAM TOPICAL
Status: DISCONTINUED | OUTPATIENT
Start: 2023-12-28 | End: 2023-12-28 | Stop reason: HOSPADM

## 2023-12-28 RX ORDER — DEXTROSE MONOHYDRATE 25 G/50ML
25-50 INJECTION, SOLUTION INTRAVENOUS
Status: DISCONTINUED | OUTPATIENT
Start: 2023-12-28 | End: 2023-12-28

## 2023-12-28 RX ORDER — NYSTATIN 100000 [USP'U]/G
POWDER TOPICAL 2 TIMES DAILY
COMMUNITY

## 2023-12-28 RX ORDER — ACETAMINOPHEN 325 MG/1
1-2 TABLET ORAL EVERY 4 HOURS PRN
COMMUNITY

## 2023-12-28 RX ORDER — HYDRALAZINE HYDROCHLORIDE 20 MG/ML
5-10 INJECTION INTRAMUSCULAR; INTRAVENOUS EVERY 10 MIN PRN
Status: DISCONTINUED | OUTPATIENT
Start: 2023-12-28 | End: 2023-12-28

## 2023-12-28 RX ORDER — GABAPENTIN 300 MG/1
300 CAPSULE ORAL AT BEDTIME
Status: DISCONTINUED | OUTPATIENT
Start: 2023-12-28 | End: 2024-01-06 | Stop reason: HOSPADM

## 2023-12-28 RX ORDER — HYDROMORPHONE HCL IN WATER/PF 6 MG/30 ML
0.4 PATIENT CONTROLLED ANALGESIA SYRINGE INTRAVENOUS EVERY 5 MIN PRN
Status: DISCONTINUED | OUTPATIENT
Start: 2023-12-28 | End: 2023-12-28

## 2023-12-28 RX ORDER — DEXTROSE MONOHYDRATE 25 G/50ML
25-50 INJECTION, SOLUTION INTRAVENOUS
Status: DISCONTINUED | OUTPATIENT
Start: 2023-12-28 | End: 2024-01-06 | Stop reason: HOSPADM

## 2023-12-28 RX ORDER — DIPHENHYDRAMINE HYDROCHLORIDE 50 MG/ML
12.5 INJECTION INTRAMUSCULAR; INTRAVENOUS EVERY 6 HOURS PRN
Status: DISCONTINUED | OUTPATIENT
Start: 2023-12-28 | End: 2023-12-28

## 2023-12-28 RX ORDER — OMEGA-3 FATTY ACIDS/FISH OIL 300-1000MG
1 CAPSULE ORAL DAILY
COMMUNITY

## 2023-12-28 RX ORDER — HYDROMORPHONE HYDROCHLORIDE 1 MG/ML
0.5 INJECTION, SOLUTION INTRAMUSCULAR; INTRAVENOUS; SUBCUTANEOUS EVERY 5 MIN PRN
Status: DISCONTINUED | OUTPATIENT
Start: 2023-12-28 | End: 2023-12-28

## 2023-12-28 RX ORDER — OXYCODONE HYDROCHLORIDE 5 MG/1
5 TABLET ORAL
Status: CANCELLED | OUTPATIENT
Start: 2023-12-28

## 2023-12-28 RX ORDER — NICOTINE POLACRILEX 4 MG
15-30 LOZENGE BUCCAL
Status: DISCONTINUED | OUTPATIENT
Start: 2023-12-28 | End: 2024-01-06 | Stop reason: HOSPADM

## 2023-12-28 RX ORDER — IOPAMIDOL 755 MG/ML
500 INJECTION, SOLUTION INTRAVASCULAR ONCE
Status: COMPLETED | OUTPATIENT
Start: 2023-12-28 | End: 2023-12-28

## 2023-12-28 RX ORDER — ONDANSETRON 2 MG/ML
4 INJECTION INTRAMUSCULAR; INTRAVENOUS EVERY 6 HOURS PRN
Status: CANCELLED | OUTPATIENT
Start: 2023-12-28

## 2023-12-28 RX ORDER — OXYCODONE HYDROCHLORIDE 5 MG/1
10 TABLET ORAL
Status: CANCELLED | OUTPATIENT
Start: 2023-12-28

## 2023-12-28 RX ORDER — PIPERACILLIN SODIUM, TAZOBACTAM SODIUM 4; .5 G/20ML; G/20ML
4.5 INJECTION, POWDER, LYOPHILIZED, FOR SOLUTION INTRAVENOUS ONCE
Status: COMPLETED | OUTPATIENT
Start: 2023-12-28 | End: 2023-12-28

## 2023-12-28 RX ORDER — LOSARTAN POTASSIUM 25 MG/1
25 TABLET ORAL DAILY
Status: DISCONTINUED | OUTPATIENT
Start: 2023-12-29 | End: 2024-01-06 | Stop reason: HOSPADM

## 2023-12-28 RX ORDER — ETOMIDATE 2 MG/ML
INJECTION INTRAVENOUS PRN
Status: DISCONTINUED | OUTPATIENT
Start: 2023-12-28 | End: 2023-12-28

## 2023-12-28 RX ORDER — ATORVASTATIN CALCIUM 40 MG/1
80 TABLET, FILM COATED ORAL AT BEDTIME
Status: DISCONTINUED | OUTPATIENT
Start: 2023-12-28 | End: 2023-12-28

## 2023-12-28 RX ORDER — GABAPENTIN 300 MG/1
300 CAPSULE ORAL AT BEDTIME
COMMUNITY

## 2023-12-28 RX ORDER — MIRTAZAPINE 30 MG/1
30 TABLET, FILM COATED ORAL AT BEDTIME
COMMUNITY

## 2023-12-28 RX ORDER — ONDANSETRON 4 MG/1
4 TABLET, ORALLY DISINTEGRATING ORAL EVERY 30 MIN PRN
Status: DISCONTINUED | OUTPATIENT
Start: 2023-12-28 | End: 2023-12-28

## 2023-12-28 RX ORDER — POLYETHYLENE GLYCOL 3350 17 G/17G
1 POWDER, FOR SOLUTION ORAL DAILY
COMMUNITY

## 2023-12-28 RX ORDER — PIPERACILLIN SODIUM, TAZOBACTAM SODIUM 3; .375 G/15ML; G/15ML
3.38 INJECTION, POWDER, LYOPHILIZED, FOR SOLUTION INTRAVENOUS EVERY 6 HOURS
Status: DISCONTINUED | OUTPATIENT
Start: 2023-12-28 | End: 2024-01-01

## 2023-12-28 RX ORDER — ONDANSETRON 2 MG/ML
INJECTION INTRAMUSCULAR; INTRAVENOUS PRN
Status: DISCONTINUED | OUTPATIENT
Start: 2023-12-28 | End: 2023-12-28

## 2023-12-28 RX ORDER — FLUMAZENIL 0.1 MG/ML
0.2 INJECTION, SOLUTION INTRAVENOUS
Status: CANCELLED | OUTPATIENT
Start: 2023-12-28 | End: 2023-12-29

## 2023-12-28 RX ORDER — LOSARTAN POTASSIUM 25 MG/1
25 TABLET ORAL DAILY
COMMUNITY

## 2023-12-28 RX ORDER — KETOROLAC TROMETHAMINE 15 MG/ML
15 INJECTION, SOLUTION INTRAMUSCULAR; INTRAVENOUS
Status: DISCONTINUED | OUTPATIENT
Start: 2023-12-28 | End: 2023-12-28

## 2023-12-28 RX ORDER — ONDANSETRON 2 MG/ML
4 INJECTION INTRAMUSCULAR; INTRAVENOUS EVERY 30 MIN PRN
Status: CANCELLED | OUTPATIENT
Start: 2023-12-28

## 2023-12-28 RX ORDER — MAGNESIUM SULFATE HEPTAHYDRATE 40 MG/ML
2 INJECTION, SOLUTION INTRAVENOUS
Status: DISCONTINUED | OUTPATIENT
Start: 2023-12-28 | End: 2023-12-28

## 2023-12-28 RX ORDER — MAGNESIUM SULFATE HEPTAHYDRATE 40 MG/ML
2 INJECTION, SOLUTION INTRAVENOUS ONCE
Status: COMPLETED | OUTPATIENT
Start: 2023-12-28 | End: 2023-12-28

## 2023-12-28 RX ORDER — LIDOCAINE HYDROCHLORIDE 20 MG/ML
INJECTION, SOLUTION INFILTRATION; PERINEURAL PRN
Status: DISCONTINUED | OUTPATIENT
Start: 2023-12-28 | End: 2023-12-28

## 2023-12-28 RX ORDER — ATORVASTATIN CALCIUM 80 MG/1
80 TABLET, FILM COATED ORAL AT BEDTIME
COMMUNITY

## 2023-12-28 RX ORDER — ONDANSETRON 2 MG/ML
4 INJECTION INTRAMUSCULAR; INTRAVENOUS EVERY 30 MIN PRN
Status: DISCONTINUED | OUTPATIENT
Start: 2023-12-28 | End: 2023-12-28

## 2023-12-28 RX ORDER — FENTANYL CITRATE 50 UG/ML
50 INJECTION, SOLUTION INTRAMUSCULAR; INTRAVENOUS EVERY 5 MIN PRN
Status: DISCONTINUED | OUTPATIENT
Start: 2023-12-28 | End: 2023-12-28

## 2023-12-28 RX ADMIN — ONDANSETRON 4 MG: 2 INJECTION INTRAMUSCULAR; INTRAVENOUS at 17:20

## 2023-12-28 RX ADMIN — SUGAMMADEX 200 MG: 100 INJECTION, SOLUTION INTRAVENOUS at 17:20

## 2023-12-28 RX ADMIN — SODIUM CHLORIDE, POTASSIUM CHLORIDE, SODIUM LACTATE AND CALCIUM CHLORIDE 1000 ML: 600; 310; 30; 20 INJECTION, SOLUTION INTRAVENOUS at 12:34

## 2023-12-28 RX ADMIN — VANCOMYCIN HYDROCHLORIDE 1250 MG: 10 INJECTION, POWDER, LYOPHILIZED, FOR SOLUTION INTRAVENOUS at 15:24

## 2023-12-28 RX ADMIN — FENTANYL CITRATE 50 MCG: 50 INJECTION INTRAMUSCULAR; INTRAVENOUS at 17:10

## 2023-12-28 RX ADMIN — SODIUM CHLORIDE 83 ML: 9 INJECTION, SOLUTION INTRAVENOUS at 13:54

## 2023-12-28 RX ADMIN — Medication 0.04 MCG/KG/MIN: at 16:47

## 2023-12-28 RX ADMIN — SODIUM CHLORIDE: 9 INJECTION, SOLUTION INTRAVENOUS at 18:02

## 2023-12-28 RX ADMIN — PIPERACILLIN AND TAZOBACTAM 4.5 G: 4; .5 INJECTION, POWDER, FOR SOLUTION INTRAVENOUS at 14:11

## 2023-12-28 RX ADMIN — MIRTAZAPINE 30 MG: 15 TABLET, FILM COATED ORAL at 22:12

## 2023-12-28 RX ADMIN — ROCURONIUM BROMIDE 50 MG: 50 INJECTION, SOLUTION INTRAVENOUS at 16:50

## 2023-12-28 RX ADMIN — PIPERACILLIN AND TAZOBACTAM 3.38 G: 3; .375 INJECTION, POWDER, FOR SOLUTION INTRAVENOUS at 20:22

## 2023-12-28 RX ADMIN — SODIUM CHLORIDE, POTASSIUM CHLORIDE, SODIUM LACTATE AND CALCIUM CHLORIDE 1000 ML: 600; 310; 30; 20 INJECTION, SOLUTION INTRAVENOUS at 14:16

## 2023-12-28 RX ADMIN — MAGNESIUM SULFATE HEPTAHYDRATE 2 G: 2 INJECTION, SOLUTION INTRAVENOUS at 14:11

## 2023-12-28 RX ADMIN — ETOMIDATE 12 MG: 2 INJECTION, SOLUTION INTRAVENOUS at 16:50

## 2023-12-28 RX ADMIN — FENTANYL CITRATE 50 MCG: 50 INJECTION INTRAMUSCULAR; INTRAVENOUS at 16:50

## 2023-12-28 RX ADMIN — SODIUM CHLORIDE, POTASSIUM CHLORIDE, SODIUM LACTATE AND CALCIUM CHLORIDE: 600; 310; 30; 20 INJECTION, SOLUTION INTRAVENOUS at 16:47

## 2023-12-28 RX ADMIN — LIDOCAINE HYDROCHLORIDE 50 MG: 20 INJECTION, SOLUTION INFILTRATION; PERINEURAL at 16:50

## 2023-12-28 RX ADMIN — IOPAMIDOL 60 ML: 755 INJECTION, SOLUTION INTRAVENOUS at 13:54

## 2023-12-28 RX ADMIN — GABAPENTIN 300 MG: 300 CAPSULE ORAL at 22:12

## 2023-12-28 ASSESSMENT — ACTIVITIES OF DAILY LIVING (ADL)
ADLS_ACUITY_SCORE: 43
ADLS_ACUITY_SCORE: 35
ADLS_ACUITY_SCORE: 35
ADLS_ACUITY_SCORE: 50

## 2023-12-28 NOTE — H&P
"[facility] History and Physical  Please note this chart is marked for merge.  The other MRN associated with this patient is [id number].  Note that her last name is misspelled on this chart.    Taylor Young MRN# [id number]   Age: 78 year old YOB: 1945         Date of Admission:  12/28/2023          Assessment and Plan:   Assessment:   Taylor Young is a 78-year-old woman who resides in an assisted living and was brought to the emergency department due to severe weakness that came on abruptly today.  She was evaluated by Dr. Mercado in the ED and working diagnosis at the time of admission is at of choledocholithiasis with ascending cholangitis and cholecystitis.    Past medical history notable for stroke which left her with significant right khari-paresis, type 2 diabetes and hypertension.  She denies history of heart and lung problems.  She denies a history of surgical procedures.    On presentation to the emergency department, VS: Afebrile, , /76, RR 28 with oxygen saturation 94% breathing room air.  Weight is estimated 54 kg.  Examination notable for the patient being alert and conversant.  She [place] strong [place] [facility] and also seems to slur her words.  Nevertheless, she appears appropriate to the situation and is interactive with me.  Extraocular movements are conjugate and there is no facial muscular asymmetry.  It is also worth noting that she has upper and lower dentures.  The abdomen is remarkably benign.  Labs: Creatinine 1.01, sodium 131, chloride 92.  /.  Alkaline phosphatase 206, bilirubin total 4.1.  Glucose 181.  Lactic acid 4.0 lipase 1548, procalcitonin 2.59.  WBC 16.0, Hgb 14.2, .  D-dimer 12.7.  Urinalysis not suggestive of urinary tract infection.  COVID, influenza A/B and RSV PCR is negative.  Imaging: CT chest PE protocol with abdomen and pelvis: \"suspicious for choledocholithiasis with resultant biliary " "obstruction...Cholelithiasis with findings of acute cholecystitis...No evidence of pulmonary embolus or other acute abnormality in the chest.\"  Interventions: Fluid resuscitation with 2 L LR.  Patient is currently on NS at 100 cc an hour.  Empirically started on vancomycin and Zosyn.  Gastroenterology, Dr. August was called and is preparing to take the patient to the operating room for ERCP.    Diagnoses:  1.  Severe weakness associated with severe sepsis.  Severe sepsis is defined by tachycardia, elevated white count, elevated lactic acid.  2.  Suspected choledocholithiasis with ascending cholangitis and cholecystitis.  3.  Preop evaluation: Although the patient is diabetic and hypertensive with a history of remote stroke, she has no known cardiac, pulmonary or significant renal disease.  4.  Diabetes mellitus.  Do not have good records but the patient tells me that she intermittently at least uses insulin.  5.  Known right hemiparesis.      Plan:   1.  The patient will be taken urgently to the operating room for ERCP.  2.  Admit to the ICU from the operating room.  3.  Continue fluid resuscitation.  RN guided protocols for electrolyte replacement.  4.  Continue with Zosyn.  I do not see an indication to continue with vancomycin at this time.  5.  Patient will need general surgery consultation as well.  I will contact them now and plan to update them after the ERCP.             Chief Complaint:     Increased weakness and somnolence.     History is obtained primarily from the emergency room physician.  To some degree the patient is also able to contribute.  We do not have significant medical records available.  I had an opportunity to speak with the patient's daughter Brianda Barrett, by telephone in order to confirm some of the history that I have collected.    Based on report from the emergency department, the patient was well up until yesterday.  This morning she was apparently calling out for help because she " was not able to get out of her wheelchair.  She apparently has had some shaking chills today but has not had clear or measured fevers.  She denied dramatic abdominal pain, nausea or vomiting.  She has not fallen.    Patient denies new or past visual disturbance.  She wears upper and lower dentures.  Denies injury and pain in her neck.  No trouble with shortness of breath or cough.  Denies history of heart trouble.  Her daughter does state that she has some memory deficits that are becoming more evident recently.        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         Allergies:   No Known Allergies          Medications:   Unable to reconcile medications at this time         Review of Systems:     Review of systems is limited by patient factors -memory deficits, acute illness.          Physical Exam:   Vitals were reviewed  Temp: 98.9  F (37.2  C) Temp src: Temporal BP: 115/59 Pulse: (!) 127   Resp: 20 SpO2: 100 %      Constitutional: Awake, alert, cooperative, no apparent distress, and appears stated age.  Eyes: Lids and lashes normal, pupils equal, round and reactive to light, extra ocular muscles intact, sclera clear, conjunctiva normal.  ENT: No lateralizing weakness.  Upper and lower dentures present.  Neck: Supple, symmetrical, trachea midline, no adenopathy, thyroid symmetric, not enlarged and no tenderness, skin normal.  Hematologic / Lymphatic: No cervical lymphadenopathy and no supraclavicular lymphadenopathy.  Lungs: No increased work of breathing, good air exchange, clear to auscultation bilaterally, no crackles or wheezing.  Cardiovascular: (Distant heart sounds) regular rate and rhythm and no murmur noted.  Abdomen: Soft, non-distended, non-tender, no masses palpated, no  hepatosplenomegaly.  Musculoskeletal: No redness, warmth, or swelling of the joints.  Increased tone involving the right upper extremity.  Patient indicates she has a small amount of movement of her right upper extremity but there is certainly stiffness in ranging the right upper extremity ROM.  Neurologic: Appears alert and pleasantly interactive.  Some difficulty with slurred words.  Appears oriented to situation.  No evident hallucinations.  No psychomotor agitation.  Skin: No rashes, erythema, pallor, petechia or purpura.          Data:     Results for orders placed or performed during the hospital encounter of 12/28/23 (from the past 24 hour(s))   Glucose by meter   Result Value Ref Range    GLUCOSE BY METER POCT 168 (H) 70 - 99 mg/dL   EKG 12-lead, tracing only   Result Value Ref Range    Systolic Blood Pressure  mmHg    Diastolic Blood Pressure  mmHg    Ventricular Rate 151 BPM    Atrial Rate 151 BPM    NY Interval 132 ms    QRS Duration 66 ms     ms    QTc 396 ms    P Axis 41 degrees    R AXIS -41 degrees    T Axis 66 degrees    Interpretation ECG       Sinus tachycardia  Left axis deviation  Anterolateral infarct , age undetermined  Abnormal ECG  No previous ECGs available     CBC with platelets differential    Narrative    The following orders were created for panel order CBC with platelets differential.  Procedure                               Abnormality         Status                     ---------                               -----------         ------                     CBC with platelets and d...[[id number]]  Abnormal            Final result                 Please view results for these tests on the individual orders.   Comprehensive metabolic panel   Result Value Ref Range    Sodium 131 (L) 135 - 145 mmol/L    Potassium 4.5 3.4 - 5.3 mmol/L    Carbon Dioxide (CO2) 22 22 - 29 mmol/L    Anion Gap 17 (H) 7 - 15 mmol/L    Urea Nitrogen 20.4 8.0 - 23.0 mg/dL    Creatinine 1.01 (H) 0.51 - 0.95 mg/dL     GFR Estimate 57 (L) >60 mL/min/1.73m2    Calcium 9.5 8.8 - 10.2 mg/dL    Chloride 92 (L) 98 - 107 mmol/L    Glucose 181 (H) 70 - 99 mg/dL    Alkaline Phosphatase 206 (H) 40 - 150 U/L     (HH) 0 - 45 U/L     (HH) 0 - 50 U/L    Protein Total 7.9 6.4 - 8.3 g/dL    Albumin 4.2 3.5 - 5.2 g/dL    Bilirubin Total 4.1 (H) <=1.2 mg/dL   Lipase   Result Value Ref Range    Lipase 1,548 (H) 13 - 60 U/L   Procalcitonin   Result Value Ref Range    Procalcitonin 2.59 (H) <0.50 ng/mL   Magnesium   Result Value Ref Range    Magnesium 1.6 (L) 1.7 - 2.3 mg/dL   CBC with platelets and differential   Result Value Ref Range    WBC Count 16.0 (H) 4.0 - 11.0 10e3/uL    RBC Count 4.89 3.80 - 5.20 10e6/uL    Hemoglobin 14.2 11.7 - 15.7 g/dL    Hematocrit 42.7 35.0 - 47.0 %    MCV 87 78 - 100 fL    MCH 29.0 26.5 - 33.0 pg    MCHC 33.3 31.5 - 36.5 g/dL    RDW 13.8 10.0 - 15.0 %    Platelet Count 263 150 - 450 10e3/uL    % Neutrophils 94 %    % Lymphocytes 2 %    % Monocytes 2 %    % Eosinophils 0 %    % Basophils 0 %    % Immature Granulocytes 2 %    NRBCs per 100 WBC 0 <1 /100    Absolute Neutrophils 15.1 (H) 1.6 - 8.3 10e3/uL    Absolute Lymphocytes 0.4 (L) 0.8 - 5.3 10e3/uL    Absolute Monocytes 0.3 0.0 - 1.3 10e3/uL    Absolute Eosinophils 0.0 0.0 - 0.7 10e3/uL    Absolute Basophils 0.0 0.0 - 0.2 10e3/uL    Absolute Immature Granulocytes 0.3 <=0.4 10e3/uL    Absolute NRBCs 0.0 10e3/uL   Ketone Beta-Hydroxybutyrate Quantitative   Result Value Ref Range    Ketone (Beta-Hydroxybutyrate) Quantitative 0.30 <=0.30 mmol/L   Alcohol level blood   Result Value Ref Range    Alcohol ethyl <0.01 <=0.01 g/dL   Extra Tube ([place]ot Draw)    Narrative    The following orders were created for panel order Extra Tube ([place]ynot Draw).  Procedure                               Abnormality         Status                     ---------                               -----------         ------                     Extra Blue Top Tube[[id number]]                               Final result               Extra Red Top Tube[[id number]]                               Final result               Extra Green Top (Lithium...[[id number]]                      Final result                 Please view results for these tests on the individual orders.   Extra Blue Top Tube   Result Value Ref Range    Hold Specimen JIC    Extra Red Top Tube   Result Value Ref Range    Hold Specimen JIC    Extra Green Top (Lithium Heparin) Tube   Result Value Ref Range    Hold Specimen JIC    D dimer quantitative   Result Value Ref Range    D-Dimer Quantitative 12.68 (H) 0.00 - 0.50 ug/mL FEU    Narrative    This D-dimer assay is intended for use in conjunction with a clinical pretest probability assessment model to exclude pulmonary embolism (PE) and deep venous thrombosis (DVT) in outpatients suspected of PE or DVT. The cut-off value is 0.50 ug/mL FEU.    For patients 50 years of age or older, the application of age-adjusted cut-off values for D-Dimer may increase the specificity without significant effect on sensitivity. The literature suggested calculation age adjusted cut-off in ug/L = age in years x 10 ug/L. The results in this laboratory are reported as ug/mL rather than ug/L. The calculation for age adjusted cut off in ug/mL= age in years x 0.01 ug/mL. For example, the cut off for a 76 year old male is 76 x 0.01 ug/mL = 0.76 ug/mL (760 ug/L).    M Delilah et al. Age adjusted D-dimer cut-off levels to rule out pulmonary embolism: The ADJUST-PE Study. BRODY 2014;311:0040-2169.; HJ Gabriela et al. Diagnostic accuracy of conventional or age adjusted D-dimer cutoff values in older patients with suspected venous thromboembolism. Systemic review and meta-analysis. BMJ 2013:346:f2492.   Symptomatic Influenza A/B, RSV, & SARS-CoV2 PCR (COVID-19) Nasopharyngeal    Specimen: Nasopharyngeal; Swab   Result Value Ref Range    Influenza A PCR Negative Negative    Influenza B PCR Negative Negative    RSV PCR  Negative Negative    SARS CoV2 PCR Negative Negative    Narrative    Testing was performed using the Xpert Xpress CoV2/Flu/RSV Assay on the [email or web address] GeneXpert Instrument. This test should be ordered for the detection of SARS-CoV-2, influenza, and RSV viruses in individuals who meet clinical and/or epidemiological criteria. Test performance is unknown in asymptomatic patients. This test is for in vitro diagnostic use under the FDA EUA for laboratories certified under CLIA to perform high or moderate complexity testing. This test has not been FDA cleared or approved. A negative result does not rule out the presence of PCR inhibitors in the specimen or target RNA in concentration below the limit of detection for the assay. If only one viral target is positive but coinfection with multiple targets is suspected, the sample should be re-tested with another FDA cleared, approved, or authorized test, if coinfection would change clinical management. This test was validated by the [facility]. These laboratories are certified under the Clinical Laboratory Improvement Amendments of 1988 (CLIA-88) as qualified to perform high complexity laboratory testing.   iStat Gases (lactate) venous, POCT   Result Value Ref Range    Lactic Acid POCT 4.0 (HH) <=2.0 mmol/L    Bicarbonate Venous POCT 24 21 - 28 mmol/L    O2 Sat, Venous POCT 35 (L) 94 - 100 %    pCO2 Venous POCT 37 (L) 40 - 50 mm Hg    pH Venous POCT 7.42 7.32 - 7.43    pO2 Venous POCT 20 (L) 25 - 47 mm Hg   Ammonia   Result Value Ref Range    Ammonia 13 11 - 51 umol/L   UA with Microscopic reflex to Culture    Specimen: Urine, Catheter   Result Value Ref Range    Color Urine Yellow Colorless, Straw, Light Yellow, Yellow    Appearance Urine Clear Clear    Glucose Urine Negative Negative mg/dL    Bilirubin Urine Negative Negative    Ketones Urine Negative Negative mg/dL    Specific Gravity Urine 1.013 1.003 - 1.035    Blood Urine Trace (A) Negative    pH Urine  7.0 5.0 - 7.0    Protein Albumin Urine 20 (A) Negative mg/dL    Urobilinogen Urine 2.0 Normal, 2.0 mg/dL    Nitrite Urine Negative Negative    Leukocyte Esterase Urine Negative Negative    Bacteria Urine Few (A) None Seen /HPF    RBC Urine 2 <=2 /HPF    WBC Urine 6 (H) <=5 /HPF    Narrative    Urine Culture not indicated   CT Chest (PE) Abdomen Pelvis w Contrast    Narrative    CT CHEST PE ABDOMEN PELVIS W CONTRAST 12/28/2023 2:07 PM    CLINICAL HISTORY: tachypnea, tachycardia, elev ddimer, fever  TECHNIQUE: CT angiogram chest and routine CT abdomen pelvis with IV  contrast. Arterial phase through the chest and venous phase through  the abdomen and pelvis. 2D and 3D MIP reconstructions were performed  by the CT technologist. Dose reduction techniques were used.     CONTRAST: 60mL [id number]-370    COMPARISON: None.    FINDINGS:  ANGIOGRAM CHEST: Pulmonary arteries are normal caliber and negative  for pulmonary emboli. Thoracic aorta is negative for dissection. No CT  evidence of right heart strain.     LUNGS AND PLEURA: Trace right pleural effusion with bibasilar  atelectasis, right greater than left. No federico airspace consolidation  or pneumothorax. Calcified granulomas.    MEDIASTINUM/AXILLAE: No suspicious lymphadenopathy.    CORONARY ARTERY CALCIFICATION: Moderate.    HEPATOBILIARY: Gallbladder is distended with internal cholelithiasis,  wall thickening and surrounding fat stranding. Mild intra and  extrahepatic biliary ductal dilation to the level of the ampulla,  where there are some subtle hyperdense foci (for example series 14  images 43 and 44).    PANCREAS: No significant mass, duct dilatation, or inflammatory  change. Small duodenal diverticuli adjacent to the pancreatic head.    SPLEEN: Normal.    ADRENAL GLANDS: Normal.    KIDNEYS/BLADDER: No hydronephrosis. Urinary bladder is partially  decompressed but otherwise unremarkable.    BOWEL: No obstruction or inflammatory change. Normal appendix. Fat  and  transverse colon containing ventral hernia without evidence of acute  complication.    LYMPH NODES: No suspicious lymphadenopathy.    PELVIC ORGANS: Uterus and adnexa are unremarkable.    OTHER: Trace free fluid in the pelvis. Moderate to severe calcified  atherosclerosis.    MUSCULOSKELETAL: No acute bony abnormality.      Impression    IMPRESSION:  1.  Findings suspicious for choledocholithiasis with resultant biliary  obstruction, consider MRCP for confirmation.  2.  Cholelithiasis with findings of acute cholecystitis, could be  secondary to #1.  3.  No evidence of pulmonary embolus or other acute abnormality in the  chest.    YRN CARRION MD         SYSTEM ID:  [id number]   EKG 12 lead   Result Value Ref Range    Systolic Blood Pressure  mmHg    Diastolic Blood Pressure  mmHg    Ventricular Rate 132 BPM    Atrial Rate 132 BPM    HI Interval 168 ms    QRS Duration 68 ms     ms    QTc 403 ms    P Axis 57 degrees    R AXIS -43 degrees    T Axis 64 degrees    Interpretation ECG       Sinus tachycardia  Left axis deviation  Low voltage QRS  Inferior infarct , age undetermined  Abnormal ECG  When compared with ECG of 28-DEC-2023 12:21, (unconfirmed)  No significant change was found     iStat Gases (lactate) venous, POCT   Result Value Ref Range    Lactic Acid POCT 4.2 (HH) <=2.0 mmol/L    Bicarbonate Venous POCT 23 21 - 28 mmol/L    O2 Sat, Venous POCT 18 (L) 94 - 100 %    pCO2 Venous POCT 41 40 - 50 mm Hg    pH Venous POCT 7.35 7.32 - 7.43    pO2 Venous POCT 15 (L) 25 - 47 mm Hg      All cardiac studies reviewed by me.   All imaging studies reviewed by me.      Attestation:  I have reviewed today's vital signs, notes, medications, labs and imaging.     Loi Melo MD

## 2023-12-28 NOTE — ED NOTES
Bed: [facility]  Expected date:   Expected time:   Means of arrival:   Comments:  Iesha [age]y, F. AMS, weakness, hx of a stroke

## 2023-12-28 NOTE — ANESTHESIA PREPROCEDURE EVALUATION
"Anesthesia Pre-Procedure Evaluation    Patient: Taylor Young   MRN: [id number] : 1945        Procedure : Procedure(s):  Endoscopic retrograde cholangiopancreatogram          No past medical history on file.   No past surgical history on file.   No Known Allergies   Social History     Tobacco Use    Smoking status: Not on file    Smokeless tobacco: Not on file   Substance Use Topics    Alcohol use: Not on file      Wt Readings from Last 1 Encounters:   23 54.2 kg (119 lb 7.8 oz)        Anesthesia Evaluation            ROS/MED HX  ENT/Pulmonary:       Neurologic:     (+)          CVA,                      Cardiovascular:     (+)  hypertension- -   -  - -                                      METS/Exercise Tolerance:     Hematologic:       Musculoskeletal:       GI/Hepatic: Comment: Ascending cholangitis    (+)          cholecystitis/cholelithiasis,          Renal/Genitourinary:       Endo:     (+)  type II DM,                    Psychiatric/Substance Use:       Infectious Disease: Comment: Severe sepsis 2/2 ascending cholangitis    (+) Recent Fever,           Malignancy:       Other:            Physical Exam    Airway        Mallampati: II   TM distance: > 3 FB   Neck ROM: full   Mouth opening: > 3 cm    Respiratory Devices and Support         Dental       (+) Modest Abnormalities - crowns, retainers, 1 or 2 missing teeth      Cardiovascular          Rhythm and rate: regular and normal     Pulmonary           breath sounds clear to auscultation           OUTSIDE LABS:  CBC:   Lab Results   Component Value Date    WBC 16.0 (H) 2023    HGB 14.2 2023    HCT 42.7 2023     2023     BMP:   Lab Results   Component Value Date     (L) 2023    POTASSIUM 4.5 2023    CHLORIDE 92 (L) 2023    CO2 22 2023    BUN 20.4 2023    CR 1.01 (H) 2023     (H) 2023     (H) 2023     COAGS: No results found for: \"PTT\", \"INR\", " "\"FIBR\"  POC: No results found for: \"BGM\", \"HCG\", \"HCGS\"  HEPATIC:   Lab Results   Component Value Date    ALBUMIN 4.2 12/28/2023    PROTTOTAL 7.9 12/28/2023     (HH) 12/28/2023     (HH) 12/28/2023    ALKPHOS 206 (H) 12/28/2023    BILITOTAL 4.1 (H) 12/28/2023    TINA 13 12/28/2023     OTHER:   Lab Results   Component Value Date    LACT 4.2 (HH) 12/28/2023    KEARA 9.5 12/28/2023    MAG 1.6 (L) 12/28/2023    LIPASE 1,548 (H) 12/28/2023       Anesthesia Plan    ASA Status:  4, emergent    NPO Status:  NPO Appropriate    Anesthesia Type: General.     - Airway: ETT   Induction: Intravenous.   Maintenance: Balanced.        Consents    Anesthesia Plan(s) and associated risks, benefits, and realistic alternatives discussed. Questions answered and patient/representative(s) expressed understanding.     - Discussed:     - Discussed with:  Patient      - Extended Intubation/Ventilatory Support Discussed: No.      - Patient is DNR/DNI Status: No     Use of blood products discussed: No .     Postoperative Care    Pain management: IV analgesics, Oral pain medications, Multi-modal analgesia.   PONV prophylaxis: Dexamethasone or Solumedrol, Ondansetron (or other 5HT-3)     Comments:    Other Comments:   Planned ICU admission post op    ASA 4 E for severe sepsis 2/2 ascending cholangitis.            Silviano Laird MD         # Hyponatremia: Lowest Na = 131 mmol/L in last 30 days, will monitor as appropriate    # Hypomagnesemia: Lowest Mg = 1.6 mg/dL in last 2 days, will replace as needed      # Drug Induced Platelet Defect: home medication list includes an antiplatelet medication      "

## 2023-12-28 NOTE — PHARMACY-ADMISSION MEDICATION HISTORY
Pharmacist Admission Medication History    Admission medication history is complete. The information provided in this note is only as accurate as the sources available at the time of the update.    Information Source(s): Facility (U/NH/) medication list/MAR - [facility] [phone]    Pertinent Information: None    Changes made to PTA medication list:  Added: ALL      Medication Affordability:  Not including over the counter (OTC) medications, was there a time in the past 3 months when you did not take your medications as prescribed because of cost?: No    Allergies reviewed with patient and updates made in EHR: yes    Medication History Completed By: Dustin Neville [facility] 12/28/2023 3:39 PM    Prior to Admission medications    Medication Sig Last Dose Taking? Auth Provider Long Term End Date   acetaminophen (TYLENOL) 325 MG tablet Take 1-2 tablets by mouth every 4 hours as needed for pain Unknown Yes Unknown, Entered By History     atorvastatin (LIPITOR) 80 MG tablet Take 80 mg by mouth at bedtime 12/27/2023 Yes Unknown, Entered By History Yes    cholecalciferol (VITAMIN D3) 125 mcg (5000 units) capsule Take 125 mcg by mouth daily 12/27/2023 at AM Yes Unknown, Entered By History     clopidogrel (PLAVIX) 75 MG tablet Take 75 mg by mouth daily 12/27/2023 at AM Yes Unknown, Entered By History Yes    docusate sodium (COLACE) 100 MG capsule Take 100 mg by mouth 2 times daily as needed for constipation Unknown Yes Unknown, Entered By History     gabapentin (NEURONTIN) 300 MG capsule Take 300 mg by mouth at bedtime 12/27/2023 at 8PM Yes Unknown, Entered By History Yes    losartan (COZAAR) 25 MG tablet Take 25 mg by mouth daily 12/27/2023 at AM Yes Unknown, Entered By History Yes    melatonin 5 MG tablet Take 5 mg by mouth nightly as needed for sleep 12/27/2023 at 8PM Yes Unknown, Entered By History     metFORMIN (GLUCOPHAGE) 1000 MG tablet Take 1,000 mg by mouth 2 times daily (with meals) 12/27/2023 at x2  Yes Unknown, Entered By History Yes    mirtazapine (REMERON) 30 MG tablet Take 30 mg by mouth at bedtime 12/27/2023 at 8PM Yes Unknown, Entered By History Yes    nystatin (MYCOSTATIN) 973099 UNIT/GM external powder Apply topically 2 times daily  Yes Unknown, Entered By History     omega 3 1000 MG CAPS Take 1 capsule by mouth daily 12/27/2023 at AM Yes Unknown, Entered By History     polyethylene glycol (MIRALAX) 17 GM/Dose powder Take 1 Capful by mouth daily 12/27/2023 at AM Yes Unknown, Entered By History     travoprost CYN FREE (TRAVATAN Z) 0.004 % ophthalmic solution Place 1 drop into both eyes at bedtime 12/27/2023 at 8PM Yes Unknown, Entered By History

## 2023-12-28 NOTE — ED PROVIDER NOTES
"[facility] Provider Note  [facility] Emergency Department  1:02 PM  12/28/2023    Taylor Young  78 year oldfemale    Chief Complaint   Patient presents with    Generalized Weakness       HPI:    78-year-old female here via EMS from a group home and report is that she was weaker than the baseline this morning and could not get out of her wheelchair when she is normally able to help do.  Subjective possible fever.  Patient does have a history of stroke with right-sided deficits.  She states that she is not in pain.  No vomiting, no sore throat or cough by her report.  No falls.  States others at home at the group home are sick but does not know with what.      States that she gets her care normally to department but however cannot find her in care everywhere.  Double checked with registration but we have the correct demographic information.      Patient's last name is misspelled.    Actual medical record number is a [id number]    In chart Care Everywhere is viewable through the [employer] system.      In that chart: Melatonin, MiraLAX, Remeron, atorvastatin, Plavix, gabapentin, metformin,    Insomnia, vitamin deficiency, chronic constipation, stroke (2016) right hemiparesis, facial droop, osteopenia, type 2 diabetes mellitus, hyperlipidemia    No history of abdominal surgeries in the [facility] records      Independent Historian:     None     Review of External Notes:     None           ROS: 10 point ROS completed and negative other than mentioned above      No current outpatient medications       No Known Allergies      Physical Exam  Vitals: /47   Pulse (!) 124   Temp 98.9  F (37.2  C) (Temporal)   Resp 28   Ht 1.575 m (5' 2\")   Wt 54.2 kg (119 lb 7.8 oz)   SpO2 98%   BMI 21.85 kg/m      HEENT: Atraumatic, mucous membranes dry, no rhinorrhea, scleral icterus  Neck: supple, no abnormal swelling  Lungs: Tachypnea, lungs clear  CV: Tachycardic  no m/r/g, ppi  Abd: soft, mild ttp ruq, nondistended, no " rebound/masses/guarding/hsm  Ext: no peripheral edema  Skin: warm, dry, well perfused, no rashes/bruising/lesions on exposed skin  Neuro: awake, following simple commands, right upper extremity and right lower extremity paralysis with contractures.,   strength intact left upper extremity, able to flex and extend the toes and at the ankle left lower extremity.  Psych: Normal mood, normal affect      Labs and Imaging:    Labs Ordered and Resulted from Time of ED Arrival to Time of ED Departure   COMPREHENSIVE METABOLIC PANEL - Abnormal       Result Value    Sodium 131 (*)     Potassium 4.5      Carbon Dioxide (CO2) 22      Anion Gap 17 (*)     Urea Nitrogen 20.4      Creatinine 1.01 (*)     GFR Estimate 57 (*)     Calcium 9.5      Chloride 92 (*)     Glucose 181 (*)     Alkaline Phosphatase 206 (*)      (*)      (*)     Protein Total 7.9      Albumin 4.2      Bilirubin Total 4.1 (*)    LIPASE - Abnormal    Lipase 1,548 (*)    PROCALCITONIN - Abnormal    Procalcitonin 2.59 (*)    MAGNESIUM - Abnormal    Magnesium 1.6 (*)    ROUTINE UA WITH MICROSCOPIC REFLEX TO CULTURE - Abnormal    Color Urine Yellow      Appearance Urine Clear      Glucose Urine Negative      Bilirubin Urine Negative      Ketones Urine Negative      Specific Gravity Urine 1.013      Blood Urine Trace (*)     pH Urine 7.0      Protein Albumin Urine 20 (*)     Urobilinogen Urine 2.0      Nitrite Urine Negative      Leukocyte Esterase Urine Negative      Bacteria Urine Few (*)     RBC Urine 2      WBC Urine 6 (*)    CBC WITH PLATELETS AND DIFFERENTIAL - Abnormal    WBC Count 16.0 (*)     RBC Count 4.89      Hemoglobin 14.2      Hematocrit 42.7      MCV 87      MCH 29.0      MCHC 33.3      RDW 13.8      Platelet Count 263      % Neutrophils 94      % Lymphocytes 2      % Monocytes 2      % Eosinophils 0      % Basophils 0      % Immature Granulocytes 2      NRBCs per 100 WBC 0      Absolute Neutrophils 15.1 (*)     Absolute Lymphocytes  0.4 (*)     Absolute Monocytes 0.3      Absolute Eosinophils 0.0      Absolute Basophils 0.0      Absolute Immature Granulocytes 0.3      Absolute NRBCs 0.0     D DIMER QUANTITATIVE - Abnormal    D-Dimer Quantitative 12.68 (*)    GLUCOSE BY METER - Abnormal    GLUCOSE BY METER POCT 168 (*)    ISTAT GASES LACTATE VENOUS POCT - Abnormal    Lactic Acid POCT 4.0 (*)     Bicarbonate Venous POCT 24      O2 Sat, Venous POCT 35 (*)     pCO2 Venous POCT 37 (*)     pH Venous POCT 7.42      pO2 Venous POCT 20 (*)    AMMONIA - Normal    Ammonia 13     INFLUENZA A/B, RSV, & SARS-COV2 PCR - Normal    Influenza A PCR Negative      Influenza B PCR Negative      RSV PCR Negative      SARS CoV2 PCR Negative     KETONE BETA-HYDROXYBUTYRATE QUANTITATIVE, RAPID - Normal    Ketone (Beta-Hydroxybutyrate) Quantitative 0.30     ETHYL ALCOHOL LEVEL - Normal    Alcohol ethyl <0.01     GLUCOSE MONITOR NURSING POCT   BLOOD CULTURE          CT Chest (PE) Abdomen Pelvis w Contrast   Final Result   IMPRESSION:   1.  Findings suspicious for choledocholithiasis with resultant biliary   obstruction, consider MRCP for confirmation.   2.  Cholelithiasis with findings of acute cholecystitis, could be   secondary to #1.   3.  No evidence of pulmonary embolus or other acute abnormality in the   chest.      YRN CARRION MD            SYSTEM ID:  [id number]      XR Surgery SARA L/T 5 Min Fluoro w Stills    (Results Pending)                Independent Interpretation (X-rays, CTs, rhythm strip):    Distended gallbladder, surrounding edema, cholelithiasis, dilated common duct    Consultations/Discussion of Management or Tests:    GI    Hospitalist service    Social Determinants of Health affecting care:    None         ED Medications:   Medications   vancomycin (VANCOCIN) 1,250 mg in 0.9% NaCl 250 mL intermittent infusion (1,250 mg Intravenous $New Bag 12/28/23 1524)   lactated ringers BOLUS 1,000 mL (1,000 mLs Intravenous $New Bag 12/28/23 1234)    piperacillin-tazobactam (ZOSYN) 4.5 g vial to attach to  mL bag (4.5 g Intravenous $New Bag 23 1411)   magnesium sulfate 2 g in 50 mL sterile water intermittent infusion (2 g Intravenous $New Bag 23 1411)   CT Scan Flush (83 mLs Intravenous $Given 23 1354)   iopamidol (ISOVUE-370) solution 500 mL (60 mLs Intravenous $Given 23 1354)   lactated ringers BOLUS 1,000 mL (1,000 mLs Intravenous $New Bag 23 1416)           ED Course:   ED Course as of 23 1528   Thu Dec 28, 2023   1226 EKG shows narrow complex irregular tachycardia with a ventricular rate of 151.  Likely sinus will repeat when particular rate is lowered to make sure this does not represent a fixed rate flutter.   1428 ECG #2 shows a ventricular rate in the 130s this is very clearly a sinus tachycardia.            Medical Decision Makin-year-old female from a group home with chronic right sided hemiparesis secondary to previous CVA here with symptoms and workup concerning for ascending cholangitis pancreatitis likely secondary to common duct obstruction from cholelithiasis.  Lactate initially 4.  Was given empiric antibiotics in the form of Zosyn and Vanco given the high suspicion for underlying infection prior to confirming source.  Will do intravenous fluids and repeat lactate admit to the hospital service consulted GI given concern for common duct obstruction.      I spoke with GI.  The patient will likely have her ERCP later this afternoon.  Hospitalist down here to evaluate the patient Emergency Department as well.        Critical Care time was 31 minutes for this patient excluding procedures.          The patient has signs of Septic Shock  The patient has signs of septic shock as evidenced by:  1. Presence of Sepsis, AND  2. Lactic Acidosis with value greater than or equal to 4    Time septic shock diagnosis confirmed = 1318  23   as this was the time when Lactate was resulted and the level was  "greater than or equal to 4    3 Hour Septic Shock Bundle Completion:  1. Initial Lactic Acid Result:   Recent Labs   Lab Test 12/28/23  1250   LACT 4.0*     2. Blood Cultures before Antibiotics: Yes  3. Broad Spectrum Antibiotics Administered:  yes       Anti-infectives (From admission through now)      Start     Dose/Rate Route Frequency Ordered Stop    12/28/23 1340  vancomycin (VANCOCIN) 1,250 mg in 0.9% NaCl 250 mL intermittent infusion         1,250 mg  over 90 Minutes Intravenous ONCE 12/28/23 1335      12/28/23 1305  piperacillin-tazobactam (ZOSYN) 4.5 g vial to attach to  mL bag        Note to Pharmacy: For [facility], [facility] and [facility]: For Zosyn-naive patients, use the \"Zosyn initial dose + extended infusion\" order panel.    4.5 g  over 30 Minutes Intravenous ONCE 12/28/23 1300 12/28/23 1441            4. IF 30 mL/kg bolus criteria met based on:  -Lactate > 4  OR  -Initial Hypotension:  Definition:  2 low BP readings (SBP <90, MAP <65, or decrease > 40 from baseline due to infection) within 3 hrs of each other during the time period of 6 hrs before and 3 hrs  after time zero  THEN: Fluid volume administered in ED:  Full 30 mL/kg bolus given (see amount below).    BMI Readings from Last 1 Encounters:   12/28/23 21.85 kg/m      30 mL/kg fluids based on weight: 1,630 mL  30 mL/kg fluids based on IBW (must be >= 60 inches tall): 1,500 mL    Septic Shock reassessment:  1. Repeat Lactic Acid Level: 4.2  2. MAP>65 after initial IVF bolus, will continue to monitor fluid status and vital signs    I attest to having performed a repeat sepsis exam and assessment of perfusion at 1504 and the results demonstrate improved perfusion.              Diagnosis:    ICD-10-CM    1. Ascending cholangitis (H28)  K83.09 Case Request: ENDOSCOPIC RETROGRADE CHOLANGIOPANCREATOGRAPHY     Case Request: ENDOSCOPIC RETROGRADE CHOLANGIOPANCREATOGRAPHY      2. Choledocholithiasis  K80.50       3. Gallstone pancreatitis  K85.10       4. Septic " shock (H)  A41.9     R65.21             Disposition:  admitted      Frandy Mercado MD  [facility]       Frandy Mercado MD  12/28/23 9142

## 2023-12-28 NOTE — ANESTHESIA PROCEDURE NOTES
Airway       Patient location during procedure: OR       Procedure Start/Stop Times: 12/28/2023 4:54 PM  Staff -        CRNA: Ishaan Klein APRN CRNA       Performed By: CRNAIndications and Patient Condition       Indications for airway management: sunny-procedural and airway protection       Induction type:intravenous       Mask difficulty assessment: 1 - vent by mask    Final Airway Details       Final airway type: endotracheal airway       Successful airway: ETT - single  Endotracheal Airway Details        ETT size (mm): 7.0       Cuffed: yes       Successful intubation technique: direct laryngoscopy       DL Blade Type: MAC 3       Grade View of Cords: 1       Adjucts: stylet       Position: Right       Measured from: gums/teeth       Secured at (cm): 22       Bite Block used: Bite block for ERCP.    Post intubation assessment        Placement verified by: capnometry, equal breath sounds and chest rise        Number of attempts at approach: 1       Secured with: tape       Ease of procedure: easy       Dentition: Intact    Medication(s) Administered   Medication Administration Time: 12/28/2023 4:54 PM

## 2023-12-28 NOTE — ANESTHESIA CARE TRANSFER NOTE
Patient: Taylor Young    Procedure: Procedure(s):  Endoscopic retrograde cholangiopancreatogram, placement of biliary stent       Diagnosis: Cholangitis (H28) [K83.09]  Diagnosis Additional Information: No value filed.    Anesthesia Type:   General     Note:    Oropharynx: oropharynx clear of all foreign objects and spontaneously breathing  Level of Consciousness: awake  Oxygen Supplementation: face mask  Level of Supplemental Oxygen (L/min / FiO2): 10  Independent Airway: airway patency satisfactory and stable  Dentition: dentition unchanged  Vital Signs Stable: post-procedure vital signs reviewed and stable  Report to RN Given: handoff report given  Patient transferred to: ICU    ICU Handoff: Call for PAUSE to initiate/utilize ICU HANDOFF, Identified Patient, Identified Responsible Provider, Reviewed the Pertinent Medical History, Discussed Surgical Course, Reviewed Intra-OP Anesthesia Management and Issues during Anesthesia, Set Expectations for Post Procedure Period and Allowed Opportunity for Questions and Acknowledgement of Understanding      Vitals:  Vitals Value Taken Time   BP     Temp     Pulse     Resp     SpO2         Electronically Signed By: ALEXEY William CRNA  December 28, 2023  5:41 PM

## 2023-12-28 NOTE — CONSULTS
"GASTROENTEROLOGY CONSULTATION     Taylor Young  [address] [place] [address]  78 year old female    Admission Date/Time: 12/28/2023  Primary Care Provider: No Ref-Primary, Physician  Referring / Attending Physician:  Kameron    We were asked to see the patient in consultation by Dr. Melo for evaluation of cholangitis/gallstone pancreatitis..    Source of information: Pt, chart, ED MD and daughter    PAST MEDICAL HISTORY:  Patient Active Problem List    Diagnosis Date Noted    Choledocholithiasis 12/28/2023     Priority: Medium    Ascending cholangitis (H28) 12/28/2023     Priority: Medium    Gallstone pancreatitis 12/28/2023     Priority: Medium    Septic shock (H) 12/28/2023     Priority: Medium       CC:     HPI:  Taylor Young is a 78 year old female who was admitted with sepsis presumably from cholangitis and gallstone pancreatitis. Pt and daughter are unable to give much history. Pt appears confused, unclear if this is from sepsis or if she has underlying dementia. A duplicate chart was made an we are awaiting merging of her charts and records from her group home. Information at this time is limited. Pt began felling ill 2 days ago with head ache and nausea/emesis. Pt denies fevers, chills, abd pain, SOB, CP. Pt with CT showing evidence of gallstones and CBD stone. Liver tests elevated, Lactate 4. WBC 16. Currently on Plavix    ROS: A comprehensive ten point review of systems was negative aside from those in mentioned in the HPI.      MEDICATIONS:   Awaiting med list from group home    ALLERGIES: No Known Allergies    SOCIAL HISTORY: Lives in group home. No known hx of tobacco, alcohol or illicit drug use. Daughter and grandchild in room       FAMILY HISTORY:  Unable to obtain due to confusion      PHYSICAL EXAM:   General  confused, rigoring, ill appearing  /59   Pulse (!) 127   Temp 98.9  F (37.2  C) (Temporal)   Resp 20   Ht 1.575 m (5' 2\")   Wt 54.2 kg (119 lb 7.8 oz)   SpO2 " 100%   BMI 21.85 kg/m      PHYSICAL EXAM:  Constitutional: moderate distress and cooperative   Cardiovascular: positive findings: tachycardia  Respiratory: negative  Psychiatric: confused  Head: Normocephalic. No masses, lesions, tenderness or abnormalities  Neck: Neck supple. No adenopathy. Thyroid symmetric, normal size,  Abdomen: Abdomen soft, non-tender. BS normal. No masses, organomegaly  NEURO: negative  SKIN: no suspicious lesions or rashes  JOINT/EXTREMITIES: extremities normal- no gross deformities noted          ADDITIONAL COMMENTS:   I reviewed the patient's new clinical lab test results.   Recent Labs   Lab Test 12/28/23  1223   WBC 16.0*   HGB 14.2   MCV 87        Recent Labs   Lab Test 12/28/23  1223   POTASSIUM 4.5   CHLORIDE 92*   CO2 22   BUN 20.4   ANIONGAP 17*     Recent Labs   Lab Test 12/28/23  1349 12/28/23  1223   ALBUMIN  --  4.2   BILITOTAL  --  4.1*   ALT  --  780*   AST  --  582*   PROTEIN 20*  --    LIPASE  --  1,548*       I reviewed the patient's new imaging results.        CONSULTATION ASSESSMENT AND PLAN:    Principal Problem:  Cholangitis/gallstone pancreatitis   -Plan for ERCP today   -Follow liver tests   - Antibiotics per hospitalist service   -Eventual cholecystectomy    Total time 30 min          Kalli August MD  [facility]  Pager: [phone]  Office: [phone]

## 2023-12-28 NOTE — ANESTHESIA POSTPROCEDURE EVALUATION
Patient: Taylor Young    Procedure: Procedure(s):  Endoscopic retrograde cholangiopancreatogram, placement of biliary stent       Anesthesia Type:  General    Note:  Disposition: ICU            ICU Sign Out: Anesthesiologist/ICU physician sign out WAS performed   Postop Pain Control: Uneventful            Sign Out: Well controlled pain   PONV: No   Neuro/Psych: Uneventful            Sign Out: Acceptable/Baseline neuro status   Airway/Respiratory: Uneventful            Sign Out: Acceptable/Baseline resp. status   CV/Hemodynamics: Uneventful            Sign Out: Acceptable CV status   Other NRE: NONE   DID A NON-ROUTINE EVENT OCCUR? No    Event details/Postop Comments:  Planned ICU post op for close monitoring and possible clinical deterioration post ERCP           Last vitals:  Vitals:    12/28/23 1500 12/28/23 1548 12/28/23 1609   BP: 117/47 115/59 113/75   Pulse: (!) 124 (!) 127 (!) 135   Resp: 20 20 22   Temp:   99.6  F (37.6  C)   SpO2: 98% 100% 98%       Electronically Signed By: Silviano Laird MD  December 28, 2023  5:51 PM   28-Jul-2023 13:00

## 2023-12-29 LAB
ACINETOBACTER SPECIES: NOT DETECTED
ALBUMIN SERPL BCG-MCNC: 2.7 G/DL (ref 3.5–5.2)
ALP SERPL-CCNC: 131 U/L (ref 40–150)
ALT SERPL W P-5'-P-CCNC: 403 U/L (ref 0–50)
ANION GAP SERPL CALCULATED.3IONS-SCNC: 11 MMOL/L (ref 7–15)
AST SERPL W P-5'-P-CCNC: 332 U/L (ref 0–45)
ATRIAL RATE - MUSE: 132 BPM
ATRIAL RATE - MUSE: 151 BPM
BILIRUB SERPL-MCNC: 2.8 MG/DL
BUN SERPL-MCNC: 19 MG/DL (ref 8–23)
CALCIUM SERPL-MCNC: 7.9 MG/DL (ref 8.8–10.2)
CHLORIDE SERPL-SCNC: 105 MMOL/L (ref 98–107)
CITROBACTER SPECIES: NOT DETECTED
CREAT SERPL-MCNC: 1.09 MG/DL (ref 0.51–0.95)
CTX-M: NOT DETECTED
DEPRECATED HCO3 PLAS-SCNC: 22 MMOL/L (ref 22–29)
DIASTOLIC BLOOD PRESSURE - MUSE: NORMAL MMHG
DIASTOLIC BLOOD PRESSURE - MUSE: NORMAL MMHG
EGFRCR SERPLBLD CKD-EPI 2021: 52 ML/MIN/1.73M2
ENTEROBACTER SPECIES: NOT DETECTED
ESCHERICHIA COLI: DETECTED
GLUCOSE BLDC GLUCOMTR-MCNC: 114 MG/DL (ref 70–99)
GLUCOSE BLDC GLUCOMTR-MCNC: 136 MG/DL (ref 70–99)
GLUCOSE BLDC GLUCOMTR-MCNC: 74 MG/DL (ref 70–99)
GLUCOSE BLDC GLUCOMTR-MCNC: 85 MG/DL (ref 70–99)
GLUCOSE BLDC GLUCOMTR-MCNC: 85 MG/DL (ref 70–99)
GLUCOSE BLDC GLUCOMTR-MCNC: 87 MG/DL (ref 70–99)
GLUCOSE BLDC GLUCOMTR-MCNC: 92 MG/DL (ref 70–99)
GLUCOSE SERPL-MCNC: 97 MG/DL (ref 70–99)
IMP: NOT DETECTED
INTERPRETATION ECG - MUSE: NORMAL
INTERPRETATION ECG - MUSE: NORMAL
KLEBSIELLA OXYTOCA: NOT DETECTED
KLEBSIELLA PNEUMONIAE: NOT DETECTED
KPC: NOT DETECTED
MAGNESIUM SERPL-MCNC: 2.1 MG/DL (ref 1.7–2.3)
NDM: NOT DETECTED
OXA (DETECTED/NOT DETECTED): NOT DETECTED
P AXIS - MUSE: 41 DEGREES
P AXIS - MUSE: 57 DEGREES
PHOSPHATE SERPL-MCNC: 3.1 MG/DL (ref 2.5–4.5)
POTASSIUM SERPL-SCNC: 3.6 MMOL/L (ref 3.4–5.3)
POTASSIUM SERPL-SCNC: 3.6 MMOL/L (ref 3.4–5.3)
PR INTERVAL - MUSE: 132 MS
PR INTERVAL - MUSE: 168 MS
PROT SERPL-MCNC: 5.3 G/DL (ref 6.4–8.3)
PROTEUS SPECIES: NOT DETECTED
PSEUDOMONAS AERUGINOSA: NOT DETECTED
QRS DURATION - MUSE: 66 MS
QRS DURATION - MUSE: 68 MS
QT - MUSE: 250 MS
QT - MUSE: 272 MS
QTC - MUSE: 396 MS
QTC - MUSE: 403 MS
R AXIS - MUSE: -41 DEGREES
R AXIS - MUSE: -43 DEGREES
SODIUM SERPL-SCNC: 138 MMOL/L (ref 135–145)
SYSTOLIC BLOOD PRESSURE - MUSE: NORMAL MMHG
SYSTOLIC BLOOD PRESSURE - MUSE: NORMAL MMHG
T AXIS - MUSE: 64 DEGREES
T AXIS - MUSE: 66 DEGREES
VENTRICULAR RATE- MUSE: 132 BPM
VENTRICULAR RATE- MUSE: 151 BPM
VIM: NOT DETECTED

## 2023-12-29 PROCEDURE — 84100 ASSAY OF PHOSPHORUS: CPT | Performed by: INTERNAL MEDICINE

## 2023-12-29 PROCEDURE — 99254 IP/OBS CNSLTJ NEW/EST MOD 60: CPT | Performed by: SURGERY

## 2023-12-29 PROCEDURE — 250N000013 HC RX MED GY IP 250 OP 250 PS 637: Performed by: INTERNAL MEDICINE

## 2023-12-29 PROCEDURE — 36415 COLL VENOUS BLD VENIPUNCTURE: CPT | Performed by: INTERNAL MEDICINE

## 2023-12-29 PROCEDURE — 200N000001 HC R&B ICU

## 2023-12-29 PROCEDURE — 80053 COMPREHEN METABOLIC PANEL: CPT | Performed by: INTERNAL MEDICINE

## 2023-12-29 PROCEDURE — 83735 ASSAY OF MAGNESIUM: CPT | Performed by: INTERNAL MEDICINE

## 2023-12-29 PROCEDURE — 250N000011 HC RX IP 250 OP 636: Performed by: INTERNAL MEDICINE

## 2023-12-29 PROCEDURE — 84132 ASSAY OF SERUM POTASSIUM: CPT | Performed by: INTERNAL MEDICINE

## 2023-12-29 PROCEDURE — 258N000003 HC RX IP 258 OP 636: Performed by: INTERNAL MEDICINE

## 2023-12-29 PROCEDURE — 99233 SBSQ HOSP IP/OBS HIGH 50: CPT | Performed by: INTERNAL MEDICINE

## 2023-12-29 RX ORDER — ACETAMINOPHEN 325 MG/1
650 TABLET ORAL EVERY 4 HOURS PRN
Status: DISCONTINUED | OUTPATIENT
Start: 2023-12-29 | End: 2024-01-01

## 2023-12-29 RX ORDER — ACETAMINOPHEN 650 MG/1
650 SUPPOSITORY RECTAL EVERY 4 HOURS PRN
Status: DISCONTINUED | OUTPATIENT
Start: 2023-12-29 | End: 2024-01-01

## 2023-12-29 RX ADMIN — SODIUM CHLORIDE: 9 INJECTION, SOLUTION INTRAVENOUS at 04:17

## 2023-12-29 RX ADMIN — GABAPENTIN 300 MG: 300 CAPSULE ORAL at 22:02

## 2023-12-29 RX ADMIN — PIPERACILLIN AND TAZOBACTAM 3.38 G: 3; .375 INJECTION, POWDER, FOR SOLUTION INTRAVENOUS at 01:44

## 2023-12-29 RX ADMIN — PIPERACILLIN AND TAZOBACTAM 3.38 G: 3; .375 INJECTION, POWDER, FOR SOLUTION INTRAVENOUS at 22:22

## 2023-12-29 RX ADMIN — PIPERACILLIN AND TAZOBACTAM 3.38 G: 3; .375 INJECTION, POWDER, FOR SOLUTION INTRAVENOUS at 10:06

## 2023-12-29 RX ADMIN — PIPERACILLIN AND TAZOBACTAM 3.38 G: 3; .375 INJECTION, POWDER, FOR SOLUTION INTRAVENOUS at 16:48

## 2023-12-29 RX ADMIN — LOSARTAN POTASSIUM 25 MG: 25 TABLET, FILM COATED ORAL at 09:06

## 2023-12-29 RX ADMIN — ACETAMINOPHEN 650 MG: 325 TABLET, FILM COATED ORAL at 22:19

## 2023-12-29 RX ADMIN — MIRTAZAPINE 30 MG: 15 TABLET, FILM COATED ORAL at 22:02

## 2023-12-29 RX ADMIN — TRAVOPROST 1 DROP: 0.04 SOLUTION OPHTHALMIC at 22:03

## 2023-12-29 ASSESSMENT — ACTIVITIES OF DAILY LIVING (ADL)
ADLS_ACUITY_SCORE: 50
ADLS_ACUITY_SCORE: 57
ADLS_ACUITY_SCORE: 50
ADLS_ACUITY_SCORE: 59
ADLS_ACUITY_SCORE: 56
ADLS_ACUITY_SCORE: 50
ADLS_ACUITY_SCORE: 56

## 2023-12-29 NOTE — PLAN OF CARE
Goal Outcome Evaluation:      Plan of Care Reviewed With: patient, child, caregiver    Overall Patient Progress: improvingOverall Patient Progress: improving     ICU End of Shift Summary.  For vital signs and complete assessments, please see documentation flowsheets.     Pertinent assessments: Neurologically alert/oriented to all spheres, right hemiparesis at baseline. Hemodynamically stable. Tolerating diet. Denies pain/N/V. Stooling and voiding well. OOB to chair most afternoon. Family and caregiver updated at bedside  Major Shift Events: As outlined above  Plan (Upcoming Events): NPO at midnight  Discharge/Transfer Needs: To transfer out of the unit when bed available    Bedside Shift Report Completed : y  Bedside Safety Check Completed: y

## 2023-12-29 NOTE — PLAN OF CARE
Shift Events: admitted to ICU for monitoring after ERCP    Neuro: R sided hemiparesis (baseline)  CV: ST 120s, MAPs >65  ID: low grade temp 99  Pulm: LS dim, 1L NC  GI: clear liquid diet  : external cath  Lines/gtts: PIVs infusing NS   Skin: intact  Labs: lactic acid 4.2 prior to ERCP    For vital signs and complete assessments, see documentation flowsheets.     Plan: continue fluids and zosyn and monitoring

## 2023-12-29 NOTE — CONSULTS
General Surgery Consultation    Taylor Young MRN# [id number]   Age: 78 year old YOB: 1945     Date of Admission:  12/28/2023    Reason for consult:            Cholangitis, cholelithiasis       Requesting physician:            Kameron                Assessment and Plan:   Assessment:   Taylor Young is a 78 year old female admitted with sepsis, ascending cholangitis, choledocholithiasis, cholelithiasis and bacteremia who underwent urgent ERCP on 12/28.           Plan:   Patient had urgent ERCP 12/28, slowly improving since admission and procedure. With severed sepsis and bacteremia and due to overall patient condition discussed with Intensivist and both agree to allow patient to recover prior to second insult of surgery for her gallbladder. It may be that she recovers well enough that she would be ok and safe for her gallbladder to come out in the next few days.  Surgery will continue to assess.                  Chief Complaint:   Abdominal pain     History is obtained from the patient, physician discussion and chart review.         History of Present Illness:   Taylor Young is a 78 year old female who presented with sepsis and cholangitis, underwent ERCP and surgery consulted regarding consideration of cholecystectomy.           Past Medical History:   No past medical history on file.          Past Surgical History:   History reviewed. No pertinent surgical history.          Social History:     Social History     Tobacco Use    Smoking status: Not on file    Smokeless tobacco: Not on file   Substance Use Topics    Alcohol use: Not on file             Family History:   History reviewed. No pertinent family history.  No family history of bleeding or clotting disorders.         Allergies:   No Known Allergies          Medications:   No current facility-administered medications on file prior to encounter.  acetaminophen (TYLENOL) 325 MG tablet, Take 1-2 tablets by mouth every 4 hours as  "needed for pain  atorvastatin (LIPITOR) 80 MG tablet, Take 80 mg by mouth at bedtime  cholecalciferol (VITAMIN D3) 125 mcg (5000 units) capsule, Take 125 mcg by mouth daily  clopidogrel (PLAVIX) 75 MG tablet, Take 75 mg by mouth daily  docusate sodium (COLACE) 100 MG capsule, Take 100 mg by mouth 2 times daily as needed for constipation  gabapentin (NEURONTIN) 300 MG capsule, Take 300 mg by mouth at bedtime  losartan (COZAAR) 25 MG tablet, Take 25 mg by mouth daily  melatonin 5 MG tablet, Take 5 mg by mouth nightly as needed for sleep  metFORMIN (GLUCOPHAGE) 1000 MG tablet, Take 1,000 mg by mouth 2 times daily (with meals)  mirtazapine (REMERON) 30 MG tablet, Take 30 mg by mouth at bedtime  nystatin (MYCOSTATIN) 693996 UNIT/GM external powder, Apply topically 2 times daily  omega 3 1000 MG CAPS, Take 1 capsule by mouth daily  polyethylene glycol (MIRALAX) 17 GM/Dose powder, Take 1 Capful by mouth daily  travoprost BAK FREE (TRAVATAN Z) 0.004 % ophthalmic solution, Place 1 drop into both eyes at bedtime       gabapentin  300 mg Oral At Bedtime    insulin aspart  1-6 Units Subcutaneous Q4H    losartan  25 mg Oral Daily    mirtazapine  30 mg Oral At Bedtime    piperacillin-tazobactam  3.375 g Intravenous Q6H    travoprost BAK FREE  1 drop Both Eyes At Bedtime            Review of Systems:   The 10 point review of systems is negative other than noted in the HPI.          Physical Exam:   /57   Pulse 101   Temp 98.7  F (37.1  C) (Axillary)   Resp 20   Ht 1.575 m (5' 2\")   Wt 57 kg (125 lb 10.6 oz)   SpO2 98%   BMI 22.98 kg/m    General - No acute distress  HEENT:  Head normocephalic and atraumatic, pupils equal and round, conjunctivae clear, mucous membranes moist, external ears and nose normal  Neck: Supple without thyromegaly or masses  Lymphatic: No cervical, or supraclavicular lymphadenopathy  Lungs: Clear to auscultation bilaterally  Heart: regular rate and rhythm, no murmurs  Abdomen: soft, rounded, " non-distended with no tenderness noted diffusely. no masses palpated  Extremities: Warm without edema  Neurologic: nonfocal  Psychiatric: Mood and affect appropriate  Skin: Without lesions, rashes         Data:     WBC -   Lab Results   Component Value Date    WBC 16.0 (H) 12/28/2023       HgB -   Lab Results   Component Value Date    HGB 14.2 12/28/2023       Plt-   Lab Results   Component Value Date     12/28/2023       Liver Function Studies -   Recent Labs   Lab Test 12/29/23  0519   PROTTOTAL 5.3*   ALBUMIN 2.7*   BILITOTAL 2.8*   ALKPHOS 131   *   *       Lipase-   Lab Results   Component Value Date    LIPASE 1,548 (H) 12/28/2023         Imaging:  All imaging studies reviewed by me.    Results for orders placed or performed during the hospital encounter of 12/28/23   CT Chest (PE) Abdomen Pelvis w Contrast    Narrative    CT CHEST PE ABDOMEN PELVIS W CONTRAST 12/28/2023 2:07 PM    CLINICAL HISTORY: tachypnea, tachycardia, elev ddimer, fever  TECHNIQUE: CT angiogram chest and routine CT abdomen pelvis with IV  contrast. Arterial phase through the chest and venous phase through  the abdomen and pelvis. 2D and 3D MIP reconstructions were performed  by the CT technologist. Dose reduction techniques were used.     CONTRAST: 60mL Isovue-370    COMPARISON: None.    FINDINGS:  ANGIOGRAM CHEST: Pulmonary arteries are normal caliber and negative  for pulmonary emboli. Thoracic aorta is negative for dissection. No CT  evidence of right heart strain.     LUNGS AND PLEURA: Trace right pleural effusion with bibasilar  atelectasis, right greater than left. No federico airspace consolidation  or pneumothorax. Calcified granulomas.    MEDIASTINUM/AXILLAE: No suspicious lymphadenopathy.    CORONARY ARTERY CALCIFICATION: Moderate.    HEPATOBILIARY: Gallbladder is distended with internal cholelithiasis,  wall thickening and surrounding fat stranding. Mild intra and  extrahepatic biliary ductal dilation to the  level of the ampulla,  where there are some subtle hyperdense foci (for example series 14  images 43 and 44).    PANCREAS: No significant mass, duct dilatation, or inflammatory  change. Small duodenal diverticuli adjacent to the pancreatic head.    SPLEEN: Normal.    ADRENAL GLANDS: Normal.    KIDNEYS/BLADDER: No hydronephrosis. Urinary bladder is partially  decompressed but otherwise unremarkable.    BOWEL: No obstruction or inflammatory change. Normal appendix. Fat and  transverse colon containing ventral hernia without evidence of acute  complication.    LYMPH NODES: No suspicious lymphadenopathy.    PELVIC ORGANS: Uterus and adnexa are unremarkable.    OTHER: Trace free fluid in the pelvis. Moderate to severe calcified  atherosclerosis.    MUSCULOSKELETAL: No acute bony abnormality.      Impression    IMPRESSION:  1.  Findings suspicious for choledocholithiasis with resultant biliary  obstruction, consider MRCP for confirmation.  2.  Cholelithiasis with findings of acute cholecystitis, could be  secondary to #1.  3.  No evidence of pulmonary embolus or other acute abnormality in the  chest.    YRN CARRION MD         SYSTEM ID:  [id number]   XR Surgery SARA L/T 5 Min Fluoro w Stills    Narrative    This exam was marked as non-reportable because it will not be read by a   radiologist or a [place] non-radiologist provider.               Time spent with the patient, reviewing the EMR, reviewing laboratory and imaging studies, counseling and coordinating care:  68 minutes.     Sotero Lafleur MD

## 2023-12-29 NOTE — PLAN OF CARE
Goal Outcome Evaluation:      Plan of Care Reviewed With: patient              ICU End of Shift Summary.  For vital signs and complete assessments, please see documentation flowsheets.      Pertinent assessments:   Neuro:Opens eyes follows some commands,speech slow.  Cardiac:ST BP a little soft  when Pt  sleeping  Resp:Regular non labored,good Sats on RA  GI:Abdomen soft no BM,denies  pain  :Incontinent  of large amts urine, Pt removed external catheterx2  Skin:No issues  Lines:PIV, SLs  Drips:NS at 100cc/hr    Major Shift Events: +BC Gram neg bacilli+E Coli,called to Tele hub    Plan (Upcoming Events): Increase activity as tolerated  Discharge/Transfer Needs: TBD     Bedside Shift Report Completed : yes  Bedside Safety Check Completed: yes

## 2023-12-29 NOTE — PROGRESS NOTES
[facility]  Hospitalist Progress Note  Ishaan So MD 12/29/23    Reason for Stay (Diagnosis): sepsis         Assessment and Plan:      Summary of Stay: Taylor Young is a 78 year old female with past medical history including CVA with right-sided deficits, type 2 diabetes, hypertension who resides in assisted living admitted on 12/28/2023 with severe weakness and malaise.    Here in the ER she was found to be in sinus tachycardia of 149 with stable blood pressure and tachypnea with respiratory rate of 28.  She was not hypoxic.  She did have some abdominal pain on exam but was mentating okay.  Lab workup was notable for mild hyponatremia of 131 with creatinine of 1.0, elevated transaminases with AST of 582 and ALT of 780 as well as alkaline phosphatase of 206 and bilirubin of 4.1.  Lactic acid was elevated at 4.0 with lipase of 1548.  Procalcitonin was elevated at 2.59 with leukocytosis of 16.0, hemoglobin 14 and normal platelet of 263.  Urinalysis was negative for infection.  COVID-19, influenza and RSV testing was negative.  CT PE protocol was negative for pulmonary embolism but did show evidence of likely choledocholithiasis with cholecystitis and stones within her gallbladder.  She was given IV fluids, started on empiric broad-spectrum antibiotics and GI was contacted for urgent ERCP.    She underwent ERCP on 12/29.  In the setting of chronic Plavix use sphincterotomy was not done and stone was not removed but instead stent was placed.  She was then admitted to the ICU for close hemodynamic monitoring and ongoing IV antibiotics and fluids.  The plan is for repeat ERCP for definitive stone management and stent removal in 4 to 6 weeks and in the short-term she will likely have cholecystectomy.  General surgery is following.    Problem List/Assessment and Plan:   Septic shock due to ascending cholangitis with choledocholithiasis, gram-negative bacteremia: Improving.  --Status post  ERCP with stent placement on 12/28, needs repeat ERCP for definitive management of choledocholithiasis and stent removal.  --Continuing empiric IV Zosyn, fluids  --Blood culture from admission with gram-negative rods, follow-up culture results  --Trend LFTs, CBC  -- General surgery following, likely cholecystectomy in the next day or 2 pending ongoing stability.    2.   Type 2 diabetes: Hold home metformin pending stability.  Replacing with sliding scale insulin for now.  Blood sugars low normal.    3.   History of CVA with right-sided deficits: Chronically maintained on Plavix.  This is being held for now pending completion of further procedures including cholecystectomy.    4.   History of hypertension: Chronically on losartan, ordered with fairly strict hold parameters.    5.   Elevated transaminases: Mixed pattern.  Bilirubin, transaminases and alkaline phosphatase trending down.  May have some component of gallstone pancreatitis given elevated lipase as well.  -- Treating supportively    6.  Generalized weakness: Due to above.  Anticipate need for PT and OT consult, particularly given pre-existing deficits from prior stroke    7.  Elevated D-dimer: With a negative CT PE study for clot.  Suspect this was related to inflammatory state from septic shock.    DVT Prophylaxis: Pneumatic Compression Devices, hold off on chemical AC pending surgery  Code Status: Full Code  Discharge Dispo/Date: ?three days to home pending hemodynamics, cholecystectomy etc    Clinically Significant Risk Factors Present on Admission           # Hypercalcemia: corrected calcium is >10.1, will monitor as appropriate  # Hypomagnesemia: Lowest Mg = 1.6 mg/dL in last 2 days, will replace as needed   # Hypoalbuminemia: Lowest albumin = 2.7 g/dL at 12/29/2023  5:19 AM, will monitor as appropriate   # Drug Induced Platelet Defect: home medication list includes an antiplatelet medication   # Hypertension: Home medication list includes  "antihypertensive(s)   # Circulatory Shock: required vasopressors within past 24 hours                        Interval History (Subjective):      Admitted yesterday, underwent ERCP  Positive blood culture for GNRs  Feeling better overall, transfer out of the ICU  LFTs better  Discussed w/ surgery, hold off on cholecystectomy at least until tomorrow                  Physical Exam:      Last Vital Signs:  /57   Pulse 101   Temp 98.7  F (37.1  C) (Axillary)   Resp 20   Ht 1.575 m (5' 2\")   Wt 57 kg (125 lb 10.6 oz)   SpO2 98%   BMI 22.98 kg/m        Intake/Output Summary (Last 24 hours) at 12/29/2023 1038  Last data filed at 12/28/2023 2200  Gross per 24 hour   Intake 1270 ml   Output 300 ml   Net 970 ml       General: Alert, awake, no acute distress but seems tired and somewhat ill.  HEENT: NC/AT, eyes anicteric, external occular movements intact, face symmetric.    Cardiac: RRR, S1, S2.  No murmurs appreciated.  Pulmonary: Normal chest rise, normal work of breathing.  Lungs CTA BL  Abdomen: Nondistended.  Currently nonpainful aside from mild right upper quadrant pain.  Extremities: no deformities.  Warm, well perfused.  Skin: no rashes or lesions noted.  Warm and Dry.  Neuro: Right-sided weakness.  Speech discernible, does have a fairly heavy accent.  Psych: Appropriate affect.         Medications:      All current medications were reviewed with changes reflected in problem list.         Data:      All new lab and imaging data was reviewed.   Labs:  Recent Labs   Lab 12/29/23  0813 12/29/23  0519   NA  --  138   POTASSIUM  --  3.6  3.6   CHLORIDE  --  105   CO2  --  22   ANIONGAP  --  11   GLC 87 85  97   BUN  --  19.0   CR  --  1.09*   GFRESTIMATED  --  52*   KEARA  --  7.9*     Recent Labs   Lab 12/28/23  1223   WBC 16.0*   HGB 14.2   HCT 42.7   MCV 87        Recent Labs   Lab 12/29/23  0519 12/28/23  1318 12/28/23  1223   *  --  582*   *  --  780*   ALKPHOS 131  --  206* "   BILITOTAL 2.8*  --  4.1*   TINA  --  13  --       Imaging:   Recent Results (from the past 48 hour(s))   CT Chest (PE) Abdomen Pelvis w Contrast    Narrative    CT CHEST PE ABDOMEN PELVIS W CONTRAST 12/28/2023 2:07 PM    CLINICAL HISTORY: tachypnea, tachycardia, elev ddimer, fever  TECHNIQUE: CT angiogram chest and routine CT abdomen pelvis with IV  contrast. Arterial phase through the chest and venous phase through  the abdomen and pelvis. 2D and 3D MIP reconstructions were performed  by the CT technologist. Dose reduction techniques were used.     CONTRAST: 60mL Isovue-370    COMPARISON: None.    FINDINGS:  ANGIOGRAM CHEST: Pulmonary arteries are normal caliber and negative  for pulmonary emboli. Thoracic aorta is negative for dissection. No CT  evidence of right heart strain.     LUNGS AND PLEURA: Trace right pleural effusion with bibasilar  atelectasis, right greater than left. No federico airspace consolidation  or pneumothorax. Calcified granulomas.    MEDIASTINUM/AXILLAE: No suspicious lymphadenopathy.    CORONARY ARTERY CALCIFICATION: Moderate.    HEPATOBILIARY: Gallbladder is distended with internal cholelithiasis,  wall thickening and surrounding fat stranding. Mild intra and  extrahepatic biliary ductal dilation to the level of the ampulla,  where there are some subtle hyperdense foci (for example series 14  images 43 and 44).    PANCREAS: No significant mass, duct dilatation, or inflammatory  change. Small duodenal diverticuli adjacent to the pancreatic head.    SPLEEN: Normal.    ADRENAL GLANDS: Normal.    KIDNEYS/BLADDER: No hydronephrosis. Urinary bladder is partially  decompressed but otherwise unremarkable.    BOWEL: No obstruction or inflammatory change. Normal appendix. Fat and  transverse colon containing ventral hernia without evidence of acute  complication.    LYMPH NODES: No suspicious lymphadenopathy.    PELVIC ORGANS: Uterus and adnexa are unremarkable.    OTHER: Trace free fluid in the  pelvis. Moderate to severe calcified  atherosclerosis.    MUSCULOSKELETAL: No acute bony abnormality.      Impression    IMPRESSION:  1.  Findings suspicious for choledocholithiasis with resultant biliary  obstruction, consider MRCP for confirmation.  2.  Cholelithiasis with findings of acute cholecystitis, could be  secondary to #1.  3.  No evidence of pulmonary embolus or other acute abnormality in the  chest.    YRN CARRION MD         SYSTEM ID:  [id number]   XR Surgery SARA L/T 5 Min Fluoro w Stills    Narrative    This exam was marked as non-reportable because it will not be read by a   radiologist or a [place] non-radiologist provider.               Ishaan So MD     I've spent 60 minutes in chart review, ordering medications and tests, obtaining additional history as needed, evaluating the patient and in documentation for this encounter.

## 2023-12-29 NOTE — PROGRESS NOTES
Chart check:  LFTs improving, ERCP yesterday with stent placement, repeat ercp in 4-6 weeks to remove stones.  Signing off but please call with questions or concerns.    Nice Hoa Estevez MD  [facility]

## 2023-12-29 NOTE — CONSULTS
Care Management Initial Consult    General Information  Assessment completed with: Care Team Member,    Type of CM/SW Visit: Initial Assessment    Primary Care Provider verified and updated as needed: Yes   Readmission within the last 30 days: no previous admission in last 30 days      Reason for Consult: discharge planning  Advance Care Planning:            Communication Assessment  Patient's communication style: spoken language (English or Bilingual)    Hearing Difficulty or Deaf: no   Wear Glasses or Blind: no    Cognitive  Cognitive/Neuro/Behavioral: WDL  Level of Consciousness: alert  Arousal Level: opens eyes spontaneously  Orientation: oriented x 4  Mood/Behavior: calm, cooperative  Best Language: 1 - Mild to moderate  Speech: clear, slurred    Living Environment:   People in home: facility resident     Current living Arrangements: group home      Able to return to prior arrangements: yes       Family/Social Support:  Care provided by: self, other (see comments) ([facility])  Provides care for: no one, unable/limited ability to care for self  Marital Status:   Children, Facility resident(s)/Staff          Description of Support System: Supportive, Involved    Support Assessment: Adequate family and caregiver support    Current Resources:   Patient receiving home care services:       Community Resources:    Equipment currently used at home: grab bar, toilet, wheelchair, power  Supplies currently used at home:      Employment/Financial:  Employment Status:          Financial Concerns:             Does the patient's insurance plan have a 3 day qualifying hospital stay waiver?  Yes     Which insurance plan 3 day waiver is available? Alternative insurance waiver    Will the waiver be used for post-acute placement? No    Lifestyle & Psychosocial Needs:  Social Determinants of Health     Food Insecurity: Not on file   Depression: Not on file   Housing Stability: Not on file   Tobacco Use: Not on file   Financial  Resource Strain: Not on file   Alcohol Use: Not on file   Transportation Needs: Not on file   Physical Activity: Not on file   Interpersonal Safety: Not on file   Stress: Not on file   Social Connections: Not on file       Functional Status:  Prior to admission patient needed assistance:   Dependent ADLs:: Bathing, Dressing, Grooming, Transfers, Wheelchair-with assist, Toileting  Dependent IADLs:: Cleaning, Laundry, Cooking, Meal Preparation, Money Management, Medication Management, Transportation, Shopping  Assesssment of Functional Status: Not at baseline with ADL Functioning    Mental Health Status:          Chemical Dependency Status:                Values/Beliefs:  Spiritual, Cultural Beliefs, Uatsdin Practices, Values that affect care:                 Additional Information:    Pt admitted with ascending cholangitis, noted to have unplanned readmission risk of 12%.  SARAH completed assessment with pt  owner Elly (P: [phone]) who reports that pt lives in their  called [facility] in [place]. Elly reports that pt is WC bound at baseline and has her WC at their facility. Elly explains that pt is AX-1 with all transfers and cares at baseline. Pt does not use O2 at baseline. They can accept pt back at any time and report that pt typically returns by hospital transport. They use [facility]. Pt  would like a physical copy of orders sent with pt at discharge as they do not have a working fax at this time. SW following.    FRANTZ Bello, [facility]  Inpatient Care Coordination  Emergency Room /Float  [phone]   Gayle Sanchez, [facility]

## 2023-12-29 NOTE — PROGRESS NOTES
"Pt asked about CPAP for sleep order. Pt says they do not wear CPAP at home, never have. No history of ANDERSON. Currently doing well on 1L NC.     /56   Pulse (!) 125   Temp 100.4  F (38  C) (Oral)   Resp 24   Ht 1.575 m (5' 2\")   Wt 54.2 kg (119 lb 7.8 oz)   SpO2 98%   BMI 21.85 kg/m      Lili Gilmar, RT      "

## 2023-12-29 NOTE — TELECONSULT
Called about positive blood cultures with [facility].  Probably e coli from cholangitis now s/p ERCP.   On zosyn already.

## 2023-12-30 LAB
ALBUMIN SERPL BCG-MCNC: 2.6 G/DL (ref 3.5–5.2)
ALP SERPL-CCNC: 155 U/L (ref 40–150)
ALT SERPL W P-5'-P-CCNC: 272 U/L (ref 0–50)
ANION GAP SERPL CALCULATED.3IONS-SCNC: 9 MMOL/L (ref 7–15)
AST SERPL W P-5'-P-CCNC: 183 U/L (ref 0–45)
BASOPHILS # BLD AUTO: 0 10E3/UL (ref 0–0.2)
BASOPHILS NFR BLD AUTO: 0 %
BILIRUB SERPL-MCNC: 2.4 MG/DL
BUN SERPL-MCNC: 11.6 MG/DL (ref 8–23)
CALCIUM SERPL-MCNC: 7.7 MG/DL (ref 8.8–10.2)
CHLORIDE SERPL-SCNC: 108 MMOL/L (ref 98–107)
CREAT SERPL-MCNC: 0.77 MG/DL (ref 0.51–0.95)
DEPRECATED HCO3 PLAS-SCNC: 19 MMOL/L (ref 22–29)
EGFRCR SERPLBLD CKD-EPI 2021: 80 ML/MIN/1.73M2
EOSINOPHIL # BLD AUTO: 0.1 10E3/UL (ref 0–0.7)
EOSINOPHIL NFR BLD AUTO: 2 %
ERYTHROCYTE [DISTWIDTH] IN BLOOD BY AUTOMATED COUNT: 14.1 % (ref 10–15)
GLUCOSE BLDC GLUCOMTR-MCNC: 114 MG/DL (ref 70–99)
GLUCOSE BLDC GLUCOMTR-MCNC: 142 MG/DL (ref 70–99)
GLUCOSE BLDC GLUCOMTR-MCNC: 146 MG/DL (ref 70–99)
GLUCOSE BLDC GLUCOMTR-MCNC: 56 MG/DL (ref 70–99)
GLUCOSE BLDC GLUCOMTR-MCNC: 73 MG/DL (ref 70–99)
GLUCOSE BLDC GLUCOMTR-MCNC: 83 MG/DL (ref 70–99)
GLUCOSE BLDC GLUCOMTR-MCNC: 85 MG/DL (ref 70–99)
GLUCOSE BLDC GLUCOMTR-MCNC: 97 MG/DL (ref 70–99)
GLUCOSE SERPL-MCNC: 86 MG/DL (ref 70–99)
HCT VFR BLD AUTO: 29.5 % (ref 35–47)
HGB BLD-MCNC: 9.5 G/DL (ref 11.7–15.7)
IMM GRANULOCYTES # BLD: 0 10E3/UL
IMM GRANULOCYTES NFR BLD: 1 %
LYMPHOCYTES # BLD AUTO: 0.6 10E3/UL (ref 0.8–5.3)
LYMPHOCYTES NFR BLD AUTO: 8 %
MAGNESIUM SERPL-MCNC: 1.8 MG/DL (ref 1.7–2.3)
MCH RBC QN AUTO: 28.6 PG (ref 26.5–33)
MCHC RBC AUTO-ENTMCNC: 32.2 G/DL (ref 31.5–36.5)
MCV RBC AUTO: 89 FL (ref 78–100)
MONOCYTES # BLD AUTO: 0.4 10E3/UL (ref 0–1.3)
MONOCYTES NFR BLD AUTO: 5 %
NEUTROPHILS # BLD AUTO: 5.9 10E3/UL (ref 1.6–8.3)
NEUTROPHILS NFR BLD AUTO: 84 %
NRBC # BLD AUTO: 0 10E3/UL
NRBC BLD AUTO-RTO: 0 /100
PHOSPHATE SERPL-MCNC: 2.2 MG/DL (ref 2.5–4.5)
PLATELET # BLD AUTO: 171 10E3/UL (ref 150–450)
POTASSIUM SERPL-SCNC: 3.5 MMOL/L (ref 3.4–5.3)
PROT SERPL-MCNC: 5.4 G/DL (ref 6.4–8.3)
RBC # BLD AUTO: 3.32 10E6/UL (ref 3.8–5.2)
SODIUM SERPL-SCNC: 136 MMOL/L (ref 135–145)
WBC # BLD AUTO: 7 10E3/UL (ref 4–11)

## 2023-12-30 PROCEDURE — 85004 AUTOMATED DIFF WBC COUNT: CPT | Performed by: INTERNAL MEDICINE

## 2023-12-30 PROCEDURE — 258N000003 HC RX IP 258 OP 636: Performed by: INTERNAL MEDICINE

## 2023-12-30 PROCEDURE — 250N000013 HC RX MED GY IP 250 OP 250 PS 637: Performed by: INTERNAL MEDICINE

## 2023-12-30 PROCEDURE — 250N000009 HC RX 250: Performed by: INTERNAL MEDICINE

## 2023-12-30 PROCEDURE — 99231 SBSQ HOSP IP/OBS SF/LOW 25: CPT | Performed by: STUDENT IN AN ORGANIZED HEALTH CARE EDUCATION/TRAINING PROGRAM

## 2023-12-30 PROCEDURE — 80053 COMPREHEN METABOLIC PANEL: CPT | Performed by: INTERNAL MEDICINE

## 2023-12-30 PROCEDURE — 84100 ASSAY OF PHOSPHORUS: CPT | Performed by: INTERNAL MEDICINE

## 2023-12-30 PROCEDURE — 36415 COLL VENOUS BLD VENIPUNCTURE: CPT | Performed by: INTERNAL MEDICINE

## 2023-12-30 PROCEDURE — 200N000001 HC R&B ICU

## 2023-12-30 PROCEDURE — 250N000011 HC RX IP 250 OP 636: Performed by: INTERNAL MEDICINE

## 2023-12-30 PROCEDURE — 83735 ASSAY OF MAGNESIUM: CPT | Performed by: INTERNAL MEDICINE

## 2023-12-30 PROCEDURE — 99233 SBSQ HOSP IP/OBS HIGH 50: CPT | Performed by: INTERNAL MEDICINE

## 2023-12-30 RX ADMIN — DEXTROSE AND SODIUM CHLORIDE: 5; 900 INJECTION, SOLUTION INTRAVENOUS at 23:27

## 2023-12-30 RX ADMIN — DEXTROSE AND SODIUM CHLORIDE: 5; 900 INJECTION, SOLUTION INTRAVENOUS at 10:16

## 2023-12-30 RX ADMIN — ACETAMINOPHEN 650 MG: 325 TABLET, FILM COATED ORAL at 20:40

## 2023-12-30 RX ADMIN — LOSARTAN POTASSIUM 25 MG: 25 TABLET, FILM COATED ORAL at 07:50

## 2023-12-30 RX ADMIN — MIRTAZAPINE 30 MG: 15 TABLET, FILM COATED ORAL at 21:55

## 2023-12-30 RX ADMIN — SODIUM PHOSPHATE, MONOBASIC, MONOHYDRATE AND SODIUM PHOSPHATE, DIBASIC, ANHYDROUS 9 MMOL: 142; 276 INJECTION, SOLUTION INTRAVENOUS at 11:47

## 2023-12-30 RX ADMIN — TRAVOPROST 1 DROP: 0.04 SOLUTION OPHTHALMIC at 21:59

## 2023-12-30 RX ADMIN — PIPERACILLIN AND TAZOBACTAM 3.38 G: 3; .375 INJECTION, POWDER, FOR SOLUTION INTRAVENOUS at 03:47

## 2023-12-30 RX ADMIN — GABAPENTIN 300 MG: 300 CAPSULE ORAL at 21:55

## 2023-12-30 RX ADMIN — PIPERACILLIN AND TAZOBACTAM 3.38 G: 3; .375 INJECTION, POWDER, FOR SOLUTION INTRAVENOUS at 22:00

## 2023-12-30 RX ADMIN — DEXTROSE 15 G: 15 GEL ORAL at 10:15

## 2023-12-30 RX ADMIN — PIPERACILLIN AND TAZOBACTAM 3.38 G: 3; .375 INJECTION, POWDER, FOR SOLUTION INTRAVENOUS at 16:13

## 2023-12-30 RX ADMIN — PIPERACILLIN AND TAZOBACTAM 3.38 G: 3; .375 INJECTION, POWDER, FOR SOLUTION INTRAVENOUS at 10:16

## 2023-12-30 RX ADMIN — SODIUM CHLORIDE: 9 INJECTION, SOLUTION INTRAVENOUS at 07:50

## 2023-12-30 ASSESSMENT — ACTIVITIES OF DAILY LIVING (ADL)
ADLS_ACUITY_SCORE: 57
ADLS_ACUITY_SCORE: 59
ADLS_ACUITY_SCORE: 57
ADLS_ACUITY_SCORE: 59
ADLS_ACUITY_SCORE: 57

## 2023-12-30 NOTE — PLAN OF CARE
Goal Outcome Evaluation:     Slept well  after bedtime meds.Incontinent large amounts urine .NPO since MN  Temp at 2000 102.5 orally MD informed,Tylenol ordered and given with good effect

## 2023-12-30 NOTE — PLAN OF CARE
Goal Outcome Evaluation:      Plan of Care Reviewed With: patient    Overall Patient Progress: improvingOverall Patient Progress: improving       ICU End of Shift Summary.  For vital signs and complete assessments, please see documentation flowsheets.     Pertinent assessments: Neurologically alert/oriented to all spheres, right hemiparesis at baseline. Hemodynamically stable ex BP on hypertensive side. Tolerating diet. Denies pain/N/V. Incontinent of bowel and bladder. OOB to chair most morning. Family updated at bedside. No skin issues noted.    Major Shift Events: As outlined above  Plan (Upcoming Events): Cholecystectomy pending  Discharge/Transfer Needs:  To transfer out of the unit when bed available   y  Bedside Shift Report Completed : y  Bedside Safety Check Completed: y

## 2023-12-30 NOTE — PROGRESS NOTES
[facility]  Hospitalist Progress Note  Ishaan So MD 12/30/2023      Reason for Stay (Diagnosis): sepsis         Assessment and Plan:      Summary of Stay: Taylor Young is a 78 year old female with past medical history including CVA with right-sided deficits, type 2 diabetes, hypertension who resides in assisted living admitted on 12/28/2023 with severe weakness and malaise.    Here in the ER she as found to be in sinus tachycardia of 149 with stable blood pressure and tachypnea with respiratory rate of 28.  She was not hypoxic.  She did have some abdominal pain on exam but was mentating okay.  Lab workup was notable for mild hyponatremia of 131 with creatinine of 1.0, elevated transaminases with AST of 582 and ALT of 780 as well as alkaline phosphatase of 206 and bilirubin of 4.1.  Lactic acid was elevated at 4.0 with lipase of 1548.  Procalcitonin was elevated at 2.59 with leukocytosis of 16.0, hemoglobin 14 and normal platelet of 263.  Urinalysis was negative for infection.  COVID-19, influenza and RSV testing was negative.  CT PE protocol was negative for pulmonary embolism but did show evidence of likely choledocholithiasis with cholecystitis and stones within her gallbladder.  She was given IV fluids, started on empiric broad-spectrum antibiotics and GI was contacted for urgent ERCP.    She underwent ERCP on 12/29.  In the setting of chronic Plavix use sphincterotomy was not done and stone was not removed but instead stent was placed.  She was then admitted to the ICU for close hemodynamic monitoring and ongoing IV antibiotics and fluids.  The plan is for repeat ERCP for definitive stone management and stent removal in 4 to 6 weeks and in the short-term she will likely have cholecystectomy.  General surgery is following.    LFTs improving, still intermittently febrile.  Cholecystectomy pending.    Problem List/Assessment and Plan:   Septic shock due to ascending cholangitis  with choledocholithiasis, E. coli bacteremia: Improving.  --Status post ERCP with stent placement on 12/28, needs repeat ERCP for definitive management of choledocholithiasis and stent removal.  --Continuing empiric IV Zosyn, fluids  --Blood culture from admission with E. coli, follow-up sensitivity testing  --Trend LFTs, CBC  -- General surgery following, likely cholecystectomy in the next day or 2 pending ongoing stability.    2.   Type 2 diabetes: Hold home metformin pending stability.  Replacing with sliding scale insulin for now.  Blood sugars low normal.    3.   History of CVA with right-sided deficits: Chronically maintained on Plavix.  This is being held for now pending completion of further procedures including cholecystectomy.    4.   History of hypertension: Chronically on losartan, ordered with fairly strict hold parameters.    5.   Elevated transaminases: Mixed pattern.  Bilirubin, transaminases and alkaline phosphatase trending down.  May have some component of gallstone pancreatitis given elevated lipase as well.  -- Treating supportively    6.  Generalized weakness: Due to above.  Anticipate need for PT and OT consult, particularly given pre-existing deficits from prior stroke    7.  Elevated D-dimer: With a negative CT PE study for clot.  Suspect this was related to inflammatory state from septic shock.    DVT Prophylaxis: Pneumatic Compression Devices, hold off on chemical AC pending surgery  Code Status: Full Code  Discharge Dispo/Date: ?three days to home pending hemodynamics, cholecystectomy etc    Clinically Significant Risk Factors           # Hypercalcemia: corrected calcium is >10.1, will monitor as appropriate  # Hypomagnesemia: Lowest Mg = 1.6 mg/dL in last 2 days, will replace as needed   # Hypoalbuminemia: Lowest albumin = 2.6 g/dL at 12/30/2023  5:32 AM, will monitor as appropriate            # Overweight: Estimated body mass index is 27.66 kg/m  as calculated from the following:     "Height as of this encounter: 1.575 m (5' 2\").    Weight as of this encounter: 68.6 kg (151 lb 3.8 oz)., PRESENT ON ADMISSION                  Interval History (Subjective):        Febrile overnight  LFTs improving  Cholecystectomy pending  Already improving  Denies pain at rest, does have pain w/ RUQ palpation.                  Physical Exam:      Last Vital Signs:  /57   Pulse 104   Temp 99  F (37.2  C) (Oral)   Resp 30   Ht 1.575 m (5' 2\")   Wt 68.6 kg (151 lb 3.8 oz)   SpO2 97%   BMI 27.66 kg/m        Intake/Output Summary (Last 24 hours) at 12/29/2023 1038  Last data filed at 12/28/2023 2200  Gross per 24 hour   Intake 1270 ml   Output 300 ml   Net 970 ml       General: Alert, awake, no acute distress but seems tired and somewhat ill.  HEENT: NC/AT, eyes anicteric, external occular movements intact, face symmetric.    Cardiac: RRR, S1, S2.  No murmurs appreciated.  Pulmonary: Normal chest rise, normal work of breathing.  Lungs CTA BL  Abdomen: Nondistended.  Currently nonpainful aside from mild right upper quadrant pain.  Extremities: no deformities.  Warm, well perfused.  Skin: no rashes or lesions noted.  Warm and Dry.  Neuro: Right-sided weakness.  Speech discernible, does have a fairly heavy accent.  Psych: Appropriate affect.         Medications:      All current medications were reviewed with changes reflected in problem list.         Data:      All new lab and imaging data was reviewed.   Labs:  Recent Labs   Lab 12/30/23  0532      POTASSIUM 3.5   CHLORIDE 108*   CO2 19*   ANIONGAP 9   GLC 86   BUN 11.6   CR 0.77   GFRESTIMATED 80   KEARA 7.7*     Recent Labs   Lab 12/30/23  0532   WBC 7.0   HGB 9.5*   HCT 29.5*   MCV 89        Recent Labs   Lab 12/30/23  0532 12/29/23  0519 12/28/23  1318 12/28/23  1223   * 332*  --  582*   * 403*  --  780*   ALKPHOS 155* 131  --  206*   BILITOTAL 2.4* 2.8*  --  4.1*   TINA  --   --  13  --       Imaging:   Recent Results (from the " past 48 hour(s))   CT Chest (PE) Abdomen Pelvis w Contrast    Narrative    CT CHEST PE ABDOMEN PELVIS W CONTRAST 12/28/2023 2:07 PM    CLINICAL HISTORY: tachypnea, tachycardia, elev ddimer, fever  TECHNIQUE: CT angiogram chest and routine CT abdomen pelvis with IV  contrast. Arterial phase through the chest and venous phase through  the abdomen and pelvis. 2D and 3D MIP reconstructions were performed  by the CT technologist. Dose reduction techniques were used.     CONTRAST: 60mL Isovue-370    COMPARISON: None.    FINDINGS:  ANGIOGRAM CHEST: Pulmonary arteries are normal caliber and negative  for pulmonary emboli. Thoracic aorta is negative for dissection. No CT  evidence of right heart strain.     LUNGS AND PLEURA: Trace right pleural effusion with bibasilar  atelectasis, right greater than left. No federico airspace consolidation  or pneumothorax. Calcified granulomas.    MEDIASTINUM/AXILLAE: No suspicious lymphadenopathy.    CORONARY ARTERY CALCIFICATION: Moderate.    HEPATOBILIARY: Gallbladder is distended with internal cholelithiasis,  wall thickening and surrounding fat stranding. Mild intra and  extrahepatic biliary ductal dilation to the level of the ampulla,  where there are some subtle hyperdense foci (for example series 14  images 43 and 44).    PANCREAS: No significant mass, duct dilatation, or inflammatory  change. Small duodenal diverticuli adjacent to the pancreatic head.    SPLEEN: Normal.    ADRENAL GLANDS: Normal.    KIDNEYS/BLADDER: No hydronephrosis. Urinary bladder is partially  decompressed but otherwise unremarkable.    BOWEL: No obstruction or inflammatory change. Normal appendix. Fat and  transverse colon containing ventral hernia without evidence of acute  complication.    LYMPH NODES: No suspicious lymphadenopathy.    PELVIC ORGANS: Uterus and adnexa are unremarkable.    OTHER: Trace free fluid in the pelvis. Moderate to severe calcified  atherosclerosis.    MUSCULOSKELETAL: No acute bony  abnormality.      Impression    IMPRESSION:  1.  Findings suspicious for choledocholithiasis with resultant biliary  obstruction, consider MRCP for confirmation.  2.  Cholelithiasis with findings of acute cholecystitis, could be  secondary to #1.  3.  No evidence of pulmonary embolus or other acute abnormality in the  chest.    YRN CARRION MD         SYSTEM ID:  [id number]   XR Surgery SARA L/T 5 Min Fluoro w Stills    Narrative    This exam was marked as non-reportable because it will not be read by a   radiologist or a [place] non-radiologist provider.               Ishaan So MD     I've spent 60 minutes in chart review, ordering medications and tests, obtaining additional history as needed, evaluating the patient and in documentation for this encounter.

## 2023-12-30 NOTE — PROGRESS NOTES
"Surgery progress note:     S: no acute events, denies abdominal pain this morning, sitting in the chair and watching TV. S/p ERCP yesterday with stone extraction and stent placement     O:   BP (!) 148/69   Pulse 90   Temp 98.2  F (36.8  C)   Resp 16   Ht 1.575 m (5' 2\")   Wt 68.6 kg (151 lb 3.8 oz)   SpO2 99%   BMI 27.66 kg/m    Generally she appears well and is alert and awake, sitting in the chair. Normal respiratory effort. Normal rhythm. Her abdomen is soft and not significantly tender throughout     A/P   #ascending cholangitis - improving after ERCP. Remains on Abx   #bacteremia   #septic shock     She appears to be improving after ERCP and resuscitation. Surgery timing TBD. Possibly in the next few days pending continued clinical improvement. Continue to trend LFTs.     Kaz Varghese MD    "

## 2023-12-31 LAB
ALBUMIN SERPL BCG-MCNC: 2.4 G/DL (ref 3.5–5.2)
ALP SERPL-CCNC: 200 U/L (ref 40–150)
ALT SERPL W P-5'-P-CCNC: 187 U/L (ref 0–50)
ANION GAP SERPL CALCULATED.3IONS-SCNC: 8 MMOL/L (ref 7–15)
AST SERPL W P-5'-P-CCNC: 117 U/L (ref 0–45)
BACTERIA BLD CULT: ABNORMAL
BILIRUB SERPL-MCNC: 1.8 MG/DL
BUN SERPL-MCNC: 6.1 MG/DL (ref 8–23)
CALCIUM SERPL-MCNC: 7.7 MG/DL (ref 8.8–10.2)
CHLORIDE SERPL-SCNC: 111 MMOL/L (ref 98–107)
CREAT SERPL-MCNC: 0.72 MG/DL (ref 0.51–0.95)
DEPRECATED HCO3 PLAS-SCNC: 18 MMOL/L (ref 22–29)
EGFRCR SERPLBLD CKD-EPI 2021: 86 ML/MIN/1.73M2
GLUCOSE BLDC GLUCOMTR-MCNC: 100 MG/DL (ref 70–99)
GLUCOSE BLDC GLUCOMTR-MCNC: 115 MG/DL (ref 70–99)
GLUCOSE BLDC GLUCOMTR-MCNC: 138 MG/DL (ref 70–99)
GLUCOSE BLDC GLUCOMTR-MCNC: 143 MG/DL (ref 70–99)
GLUCOSE BLDC GLUCOMTR-MCNC: 86 MG/DL (ref 70–99)
GLUCOSE BLDC GLUCOMTR-MCNC: 90 MG/DL (ref 70–99)
GLUCOSE SERPL-MCNC: 144 MG/DL (ref 70–99)
HOLD SPECIMEN: NORMAL
MAGNESIUM SERPL-MCNC: 1.6 MG/DL (ref 1.7–2.3)
PHOSPHATE SERPL-MCNC: 1.7 MG/DL (ref 2.5–4.5)
PHOSPHATE SERPL-MCNC: 2.1 MG/DL (ref 2.5–4.5)
POTASSIUM SERPL-SCNC: 3.3 MMOL/L (ref 3.4–5.3)
POTASSIUM SERPL-SCNC: 3.3 MMOL/L (ref 3.4–5.3)
POTASSIUM SERPL-SCNC: 3.8 MMOL/L (ref 3.4–5.3)
PROT SERPL-MCNC: 5.1 G/DL (ref 6.4–8.3)
SODIUM SERPL-SCNC: 137 MMOL/L (ref 135–145)

## 2023-12-31 PROCEDURE — 250N000009 HC RX 250: Performed by: INTERNAL MEDICINE

## 2023-12-31 PROCEDURE — 250N000013 HC RX MED GY IP 250 OP 250 PS 637: Performed by: INTERNAL MEDICINE

## 2023-12-31 PROCEDURE — 250N000011 HC RX IP 250 OP 636: Performed by: INTERNAL MEDICINE

## 2023-12-31 PROCEDURE — 83735 ASSAY OF MAGNESIUM: CPT | Performed by: INTERNAL MEDICINE

## 2023-12-31 PROCEDURE — 258N000003 HC RX IP 258 OP 636: Performed by: INTERNAL MEDICINE

## 2023-12-31 PROCEDURE — 200N000001 HC R&B ICU

## 2023-12-31 PROCEDURE — 84132 ASSAY OF SERUM POTASSIUM: CPT | Performed by: INTERNAL MEDICINE

## 2023-12-31 PROCEDURE — 84100 ASSAY OF PHOSPHORUS: CPT | Performed by: INTERNAL MEDICINE

## 2023-12-31 PROCEDURE — 99233 SBSQ HOSP IP/OBS HIGH 50: CPT | Performed by: INTERNAL MEDICINE

## 2023-12-31 PROCEDURE — 80053 COMPREHEN METABOLIC PANEL: CPT | Performed by: INTERNAL MEDICINE

## 2023-12-31 PROCEDURE — 87040 BLOOD CULTURE FOR BACTERIA: CPT | Performed by: INTERNAL MEDICINE

## 2023-12-31 PROCEDURE — 36415 COLL VENOUS BLD VENIPUNCTURE: CPT | Performed by: INTERNAL MEDICINE

## 2023-12-31 RX ORDER — POTASSIUM CHLORIDE 1500 MG/1
40 TABLET, EXTENDED RELEASE ORAL ONCE
Status: COMPLETED | OUTPATIENT
Start: 2023-12-31 | End: 2023-12-31

## 2023-12-31 RX ORDER — POTASSIUM CHLORIDE 1.5 G/1.58G
40 POWDER, FOR SOLUTION ORAL ONCE
Status: COMPLETED | OUTPATIENT
Start: 2023-12-31 | End: 2023-12-31

## 2023-12-31 RX ADMIN — DEXTROSE AND SODIUM CHLORIDE: 5; 900 INJECTION, SOLUTION INTRAVENOUS at 12:13

## 2023-12-31 RX ADMIN — TRAVOPROST 1 DROP: 0.04 SOLUTION OPHTHALMIC at 21:14

## 2023-12-31 RX ADMIN — PIPERACILLIN AND TAZOBACTAM 3.38 G: 3; .375 INJECTION, POWDER, FOR SOLUTION INTRAVENOUS at 04:20

## 2023-12-31 RX ADMIN — GABAPENTIN 300 MG: 300 CAPSULE ORAL at 21:09

## 2023-12-31 RX ADMIN — SODIUM PHOSPHATE, MONOBASIC, MONOHYDRATE AND SODIUM PHOSPHATE, DIBASIC, ANHYDROUS 9 MMOL: 142; 276 INJECTION, SOLUTION INTRAVENOUS at 20:12

## 2023-12-31 RX ADMIN — MIRTAZAPINE 30 MG: 15 TABLET, FILM COATED ORAL at 21:09

## 2023-12-31 RX ADMIN — PIPERACILLIN AND TAZOBACTAM 3.38 G: 3; .375 INJECTION, POWDER, FOR SOLUTION INTRAVENOUS at 21:09

## 2023-12-31 RX ADMIN — SODIUM PHOSPHATE, MONOBASIC, MONOHYDRATE AND SODIUM PHOSPHATE, DIBASIC, ANHYDROUS 15 MMOL: 142; 276 INJECTION, SOLUTION INTRAVENOUS at 09:13

## 2023-12-31 RX ADMIN — POTASSIUM CHLORIDE 40 MEQ: 1.5 POWDER, FOR SOLUTION ORAL at 09:13

## 2023-12-31 RX ADMIN — LOSARTAN POTASSIUM 25 MG: 25 TABLET, FILM COATED ORAL at 09:13

## 2023-12-31 RX ADMIN — PIPERACILLIN AND TAZOBACTAM 3.38 G: 3; .375 INJECTION, POWDER, FOR SOLUTION INTRAVENOUS at 09:13

## 2023-12-31 RX ADMIN — PIPERACILLIN AND TAZOBACTAM 3.38 G: 3; .375 INJECTION, POWDER, FOR SOLUTION INTRAVENOUS at 17:21

## 2023-12-31 ASSESSMENT — ACTIVITIES OF DAILY LIVING (ADL)
ADLS_ACUITY_SCORE: 57
ADLS_ACUITY_SCORE: 59
ADLS_ACUITY_SCORE: 57
ADLS_ACUITY_SCORE: 61
ADLS_ACUITY_SCORE: 59
ADLS_ACUITY_SCORE: 59
ADLS_ACUITY_SCORE: 57

## 2023-12-31 NOTE — PLAN OF CARE
Shift Events: Afebrile today. Blood cultures repeated. NPO majority of day pending being seen by surgeryUp to chair. BG 's. On D5 NS.  No appetite this evening when offered food. K+ and Phos+ replaced. Incontinent of urine & stool (x1) Denies pain.

## 2023-12-31 NOTE — PROGRESS NOTES
[facility]  Hospitalist Progress Note  Ishaan So MD 12/31/2023      Reason for Stay (Diagnosis): sepsis         Assessment and Plan:      Summary of Stay: Taylor Young is a 78 year old female with past medical history including CVA with right-sided deficits, type 2 diabetes, hypertension who resides in assisted living admitted on 12/28/2023 with severe weakness and malaise.    Here in the ER she as found to be in sinus tachycardia of 149 with stable blood pressure and tachypnea with respiratory rate of 28.  She was not hypoxic.  She did have some abdominal pain on exam but was mentating okay.  Lab workup was notable for mild hyponatremia of 131 with creatinine of 1.0, elevated transaminases with AST of 582 and ALT of 780 as well as alkaline phosphatase of 206 and bilirubin of 4.1.  Lactic acid was elevated at 4.0 with lipase of 1548.  Procalcitonin was elevated at 2.59 with leukocytosis of 16.0, hemoglobin 14 and normal platelet of 263.  Urinalysis was negative for infection.  COVID-19, influenza and RSV testing was negative.  CT PE protocol was negative for pulmonary embolism but did show evidence of likely choledocholithiasis with cholecystitis and stones within her gallbladder.  She was given IV fluids, started on empiric broad-spectrum antibiotics and GI was contacted for urgent ERCP.    She underwent ERCP on 12/29.  In the setting of chronic Plavix use sphincterotomy was not done and stone was not removed but instead stent was placed.  She was then admitted to the ICU for close hemodynamic monitoring and ongoing IV antibiotics and fluids.  The plan is for repeat ERCP for definitive stone management and stent removal in 4 to 6 weeks and in the short-term she will likely have cholecystectomy.  General surgery is following.    LFTs improving, still intermittently febrile.  Cholecystectomy pending.    Problem List/Assessment and Plan:   Septic shock due to ascending cholangitis  with choledocholithiasis, E. coli bacteremia, klebsiella bacteremia: Improving.  --Status post ERCP with stent placement on 12/28, needs repeat ERCP for definitive management of choledocholithiasis and stent removal.  --Continuing empiric IV Zosyn, fluids  --Blood culture from admission with E. coli, follow-up sensitivities  --repeat blood cultures 12/31 given ongoing intermittent high fevers.  --Trend LFTs, CBC  -- General surgery following, likely cholecystectomy in the next day or 2    2.   Type 2 diabetes: Hold home metformin pending stability.  Replacing with sliding scale insulin for now.  Blood sugars low normal.    3.   History of CVA with right-sided deficits: Chronically maintained on Plavix.  This is being held for now pending completion of further procedures including cholecystectomy.    4.   History of hypertension: Chronically on losartan, ordered with fairly strict hold parameters.    5.   Elevated transaminases: Mixed pattern.  Bilirubin, transaminases and alkaline phosphatase trending down.  May have some component of gallstone pancreatitis given elevated lipase as well.  -- Treating supportively    6.  Generalized weakness: Due to above.  Anticipate need for PT and OT consult, particularly given pre-existing deficits from prior stroke    7.  Elevated D-dimer: With a negative CT PE study for clot.  Suspect this was related to inflammatory state from septic shock.  8.  Bereavement: Her son reportedly passed away of advanced liver disease and liver cancer on 12/29 at age 51.    DVT Prophylaxis: Pneumatic Compression Devices, hold off on chemical AC pending surgery  Code Status: Full Code  Discharge Dispo/Date: ?three days to home pending hemodynamics, cholecystectomy etc    Clinically Significant Risk Factors        # Hypokalemia: Lowest K = 3.3 mmol/L in last 2 days, will replace as needed     # Hypomagnesemia: Lowest Mg = 1.6 mg/dL in last 2 days, will replace as needed   # Hypoalbuminemia: Lowest  "albumin = 2.6 g/dL at 12/30/2023  5:32 AM, will monitor as appropriate            # Overweight: Estimated body mass index is 27.66 kg/m  as calculated from the following:    Height as of this encounter: 1.575 m (5' 2\").    Weight as of this encounter: 68.6 kg (151 lb 3.8 oz)., PRESENT ON ADMISSION                  Interval History (Subjective):        Febrile overnight again  Updated her daughter  Pending cholecystectomy, NPO until seen by Surgery  Still having some right upper quadrant pain with palpation                  Physical Exam:      Last Vital Signs:  /52   Pulse 82   Temp 98.6  F (37  C) (Temporal)   Resp 17   Ht 1.575 m (5' 2\")   Wt 68.6 kg (151 lb 3.8 oz)   SpO2 99%   BMI 27.66 kg/m        Intake/Output Summary (Last 24 hours) at 12/29/2023 1038  Last data filed at 12/28/2023 2200  Gross per 24 hour   Intake 1270 ml   Output 300 ml   Net 970 ml       General: Alert, awake, no acute distress but seems tired and somewhat ill.  HEENT: NC/AT, eyes anicteric, external occular movements intact, face symmetric.    Cardiac: RRR, S1, S2.  No murmurs appreciated.  Pulmonary: Normal chest rise, normal work of breathing.  Lungs CTA BL  Abdomen: Nondistended.  Currently nonpainful aside from mild right upper quadrant pain.  Extremities: no deformities.  Warm, well perfused.  Skin: no rashes or lesions noted.  Warm and Dry.  Neuro: Right-sided weakness.  Speech discernible, does have a fairly heavy accent.  Psych: Appropriate affect.         Medications:      All current medications were reviewed with changes reflected in problem list.         Data:      All new lab and imaging data was reviewed.   Labs:  Recent Labs   Lab 12/31/23  0824 12/31/23  0533   NA  --  137   POTASSIUM  --  3.3*  3.3*   CHLORIDE  --  111*   CO2  --  18*   ANIONGAP  --  8   * 144*   BUN  --  6.1*   CR  --  0.72   GFRESTIMATED  --  86   KEARA  --  7.7*     Recent Labs   Lab 12/30/23  0532   WBC 7.0   HGB 9.5*   HCT 29.5* "   MCV 89        Recent Labs   Lab 12/31/23  0533 12/30/23  0532 12/29/23  0519 12/28/23  1318   * 183* 332*  --    * 272* 403*  --    ALKPHOS 200* 155* 131  --    BILITOTAL 1.8* 2.4* 2.8*  --    TINA  --   --   --  13      Imaging:   Recent Results (from the past 48 hour(s))   CT Chest (PE) Abdomen Pelvis w Contrast    Narrative    CT CHEST PE ABDOMEN PELVIS W CONTRAST 12/28/2023 2:07 PM    CLINICAL HISTORY: tachypnea, tachycardia, elev ddimer, fever  TECHNIQUE: CT angiogram chest and routine CT abdomen pelvis with IV  contrast. Arterial phase through the chest and venous phase through  the abdomen and pelvis. 2D and 3D MIP reconstructions were performed  by the CT technologist. Dose reduction techniques were used.     CONTRAST: 60mL Isovue-370    COMPARISON: None.    FINDINGS:  ANGIOGRAM CHEST: Pulmonary arteries are normal caliber and negative  for pulmonary emboli. Thoracic aorta is negative for dissection. No CT  evidence of right heart strain.     LUNGS AND PLEURA: Trace right pleural effusion with bibasilar  atelectasis, right greater than left. No federico airspace consolidation  or pneumothorax. Calcified granulomas.    MEDIASTINUM/AXILLAE: No suspicious lymphadenopathy.    CORONARY ARTERY CALCIFICATION: Moderate.    HEPATOBILIARY: Gallbladder is distended with internal cholelithiasis,  wall thickening and surrounding fat stranding. Mild intra and  extrahepatic biliary ductal dilation to the level of the ampulla,  where there are some subtle hyperdense foci (for example series 14  images 43 and 44).    PANCREAS: No significant mass, duct dilatation, or inflammatory  change. Small duodenal diverticuli adjacent to the pancreatic head.    SPLEEN: Normal.    ADRENAL GLANDS: Normal.    KIDNEYS/BLADDER: No hydronephrosis. Urinary bladder is partially  decompressed but otherwise unremarkable.    BOWEL: No obstruction or inflammatory change. Normal appendix. Fat and  transverse colon containing  ventral hernia without evidence of acute  complication.    LYMPH NODES: No suspicious lymphadenopathy.    PELVIC ORGANS: Uterus and adnexa are unremarkable.    OTHER: Trace free fluid in the pelvis. Moderate to severe calcified  atherosclerosis.    MUSCULOSKELETAL: No acute bony abnormality.      Impression    IMPRESSION:  1.  Findings suspicious for choledocholithiasis with resultant biliary  obstruction, consider MRCP for confirmation.  2.  Cholelithiasis with findings of acute cholecystitis, could be  secondary to #1.  3.  No evidence of pulmonary embolus or other acute abnormality in the  chest.    YRN CARRION MD         SYSTEM ID:  [id number]   XR Surgery SARA L/T 5 Min Fluoro w Stills    Narrative    This exam was marked as non-reportable because it will not be read by a   radiologist or a [place] non-radiologist provider.               Ishaan So MD     I've spent 60 minutes in chart review, ordering medications and tests, obtaining additional history as needed, evaluating the patient and in documentation for this encounter.

## 2023-12-31 NOTE — PLAN OF CARE
ICU End of Shift Summary.  For vital signs and complete assessments, please see documentation flowsheets.      Pertinent assessments:   Neuro: A&Ox4. Uses call light appropriately, able to make needs known. R sided hemiparesis d/t prior stroke.   Cardiac: No edema noted.   Resp: on RA.   GI: BMx1-incontinent.   : Good UOP- incontinent of bladder. Declines use of purewick.   Skin: See flowsheets.   Lines: PIVx3  Drips: D5 NS @ 75ml/hr    Major Shift Events:     Tmax of 103, PRN tylenol given-was effective.     Plan (Upcoming Events): general surgery following and to eval on daily basis if pt is ready to have a cholecystectomy.   Discharge/Transfer Needs: is Medical overflow status. Transfer to medical floor when bed available.     Bedside Shift Report Completed : Yes  Bedside Safety Check Completed: Yes

## 2024-01-01 ENCOUNTER — ANESTHESIA (OUTPATIENT)
Dept: SURGERY | Facility: CLINIC | Age: 79
DRG: 853 | End: 2024-01-01
Payer: COMMERCIAL

## 2024-01-01 ENCOUNTER — ANESTHESIA EVENT (OUTPATIENT)
Dept: SURGERY | Facility: CLINIC | Age: 79
DRG: 853 | End: 2024-01-01
Payer: COMMERCIAL

## 2024-01-01 LAB
ALBUMIN SERPL BCG-MCNC: 2.3 G/DL (ref 3.5–5.2)
ALP SERPL-CCNC: 239 U/L (ref 40–150)
ALT SERPL W P-5'-P-CCNC: 164 U/L (ref 0–50)
ANION GAP SERPL CALCULATED.3IONS-SCNC: 9 MMOL/L (ref 7–15)
AST SERPL W P-5'-P-CCNC: 112 U/L (ref 0–45)
BASOPHILS # BLD AUTO: 0 10E3/UL (ref 0–0.2)
BASOPHILS NFR BLD AUTO: 1 %
BILIRUB SERPL-MCNC: 1.4 MG/DL
BUN SERPL-MCNC: 4.2 MG/DL (ref 8–23)
CALCIUM SERPL-MCNC: 7.6 MG/DL (ref 8.8–10.2)
CHLORIDE SERPL-SCNC: 107 MMOL/L (ref 98–107)
CREAT SERPL-MCNC: 0.64 MG/DL (ref 0.51–0.95)
DEPRECATED HCO3 PLAS-SCNC: 18 MMOL/L (ref 22–29)
EGFRCR SERPLBLD CKD-EPI 2021: >90 ML/MIN/1.73M2
EOSINOPHIL # BLD AUTO: 0.1 10E3/UL (ref 0–0.7)
EOSINOPHIL NFR BLD AUTO: 2 %
ERYTHROCYTE [DISTWIDTH] IN BLOOD BY AUTOMATED COUNT: 14.3 % (ref 10–15)
GLUCOSE BLDC GLUCOMTR-MCNC: 111 MG/DL (ref 70–99)
GLUCOSE BLDC GLUCOMTR-MCNC: 126 MG/DL (ref 70–99)
GLUCOSE BLDC GLUCOMTR-MCNC: 127 MG/DL (ref 70–99)
GLUCOSE BLDC GLUCOMTR-MCNC: 80 MG/DL (ref 70–99)
GLUCOSE BLDC GLUCOMTR-MCNC: 85 MG/DL (ref 70–99)
GLUCOSE BLDC GLUCOMTR-MCNC: 93 MG/DL (ref 70–99)
GLUCOSE BLDC GLUCOMTR-MCNC: 96 MG/DL (ref 70–99)
GLUCOSE BLDC GLUCOMTR-MCNC: 98 MG/DL (ref 70–99)
GLUCOSE SERPL-MCNC: 102 MG/DL (ref 70–99)
HCT VFR BLD AUTO: 30 % (ref 35–47)
HGB BLD-MCNC: 10.1 G/DL (ref 11.7–15.7)
IMM GRANULOCYTES # BLD: 0 10E3/UL
IMM GRANULOCYTES NFR BLD: 0 %
LYMPHOCYTES # BLD AUTO: 0.7 10E3/UL (ref 0.8–5.3)
LYMPHOCYTES NFR BLD AUTO: 18 %
MAGNESIUM SERPL-MCNC: 1.3 MG/DL (ref 1.7–2.3)
MAGNESIUM SERPL-MCNC: 2.5 MG/DL (ref 1.7–2.3)
MCH RBC QN AUTO: 28.8 PG (ref 26.5–33)
MCHC RBC AUTO-ENTMCNC: 33.7 G/DL (ref 31.5–36.5)
MCV RBC AUTO: 86 FL (ref 78–100)
MONOCYTES # BLD AUTO: 0.5 10E3/UL (ref 0–1.3)
MONOCYTES NFR BLD AUTO: 12 %
NEUTROPHILS # BLD AUTO: 2.6 10E3/UL (ref 1.6–8.3)
NEUTROPHILS NFR BLD AUTO: 67 %
NRBC # BLD AUTO: 0 10E3/UL
NRBC BLD AUTO-RTO: 0 /100
PHOSPHATE SERPL-MCNC: 2.3 MG/DL (ref 2.5–4.5)
PLATELET # BLD AUTO: 190 10E3/UL (ref 150–450)
POTASSIUM SERPL-SCNC: 3.5 MMOL/L (ref 3.4–5.3)
PROT SERPL-MCNC: 5.3 G/DL (ref 6.4–8.3)
RBC # BLD AUTO: 3.51 10E6/UL (ref 3.8–5.2)
SODIUM SERPL-SCNC: 134 MMOL/L (ref 135–145)
WBC # BLD AUTO: 3.9 10E3/UL (ref 4–11)

## 2024-01-01 PROCEDURE — 360N000076 HC SURGERY LEVEL 3, PER MIN: Performed by: SURGERY

## 2024-01-01 PROCEDURE — 84100 ASSAY OF PHOSPHORUS: CPT | Performed by: INTERNAL MEDICINE

## 2024-01-01 PROCEDURE — 200N000001 HC R&B ICU

## 2024-01-01 PROCEDURE — 36415 COLL VENOUS BLD VENIPUNCTURE: CPT | Performed by: INTERNAL MEDICINE

## 2024-01-01 PROCEDURE — 258N000003 HC RX IP 258 OP 636: Performed by: INTERNAL MEDICINE

## 2024-01-01 PROCEDURE — 370N000017 HC ANESTHESIA TECHNICAL FEE, PER MIN: Performed by: SURGERY

## 2024-01-01 PROCEDURE — 250N000009 HC RX 250: Performed by: INTERNAL MEDICINE

## 2024-01-01 PROCEDURE — 250N000009 HC RX 250: Performed by: SURGERY

## 2024-01-01 PROCEDURE — 250N000009 HC RX 250: Performed by: ANESTHESIOLOGY

## 2024-01-01 PROCEDURE — 250N000013 HC RX MED GY IP 250 OP 250 PS 637: Performed by: INTERNAL MEDICINE

## 2024-01-01 PROCEDURE — 88304 TISSUE EXAM BY PATHOLOGIST: CPT | Mod: TC | Performed by: SURGERY

## 2024-01-01 PROCEDURE — 250N000011 HC RX IP 250 OP 636: Performed by: INTERNAL MEDICINE

## 2024-01-01 PROCEDURE — 99231 SBSQ HOSP IP/OBS SF/LOW 25: CPT | Mod: 57 | Performed by: SURGERY

## 2024-01-01 PROCEDURE — 250N000011 HC RX IP 250 OP 636: Performed by: ANESTHESIOLOGY

## 2024-01-01 PROCEDURE — 88304 TISSUE EXAM BY PATHOLOGIST: CPT | Mod: 26 | Performed by: PATHOLOGY

## 2024-01-01 PROCEDURE — 710N000009 HC RECOVERY PHASE 1, LEVEL 1, PER MIN: Performed by: SURGERY

## 2024-01-01 PROCEDURE — 99232 SBSQ HOSP IP/OBS MODERATE 35: CPT | Performed by: INTERNAL MEDICINE

## 2024-01-01 PROCEDURE — 258N000003 HC RX IP 258 OP 636: Performed by: ANESTHESIOLOGY

## 2024-01-01 PROCEDURE — 250N000011 HC RX IP 250 OP 636: Performed by: SURGERY

## 2024-01-01 PROCEDURE — 85025 COMPLETE CBC W/AUTO DIFF WBC: CPT | Performed by: INTERNAL MEDICINE

## 2024-01-01 PROCEDURE — 47562 LAPAROSCOPIC CHOLECYSTECTOMY: CPT | Mod: AS | Performed by: PHYSICIAN ASSISTANT

## 2024-01-01 PROCEDURE — 272N000001 HC OR GENERAL SUPPLY STERILE: Performed by: SURGERY

## 2024-01-01 PROCEDURE — 83735 ASSAY OF MAGNESIUM: CPT | Performed by: INTERNAL MEDICINE

## 2024-01-01 PROCEDURE — 80053 COMPREHEN METABOLIC PANEL: CPT | Performed by: INTERNAL MEDICINE

## 2024-01-01 PROCEDURE — 250N000013 HC RX MED GY IP 250 OP 250 PS 637: Performed by: SURGERY

## 2024-01-01 PROCEDURE — 999N000141 HC STATISTIC PRE-PROCEDURE NURSING ASSESSMENT: Performed by: SURGERY

## 2024-01-01 PROCEDURE — 250N000025 HC SEVOFLURANE, PER MIN: Performed by: SURGERY

## 2024-01-01 PROCEDURE — 0FT44ZZ RESECTION OF GALLBLADDER, PERCUTANEOUS ENDOSCOPIC APPROACH: ICD-10-PCS | Performed by: SURGERY

## 2024-01-01 PROCEDURE — 47562 LAPAROSCOPIC CHOLECYSTECTOMY: CPT | Performed by: SURGERY

## 2024-01-01 RX ORDER — HYDROMORPHONE HCL IN WATER/PF 6 MG/30 ML
0.2 PATIENT CONTROLLED ANALGESIA SYRINGE INTRAVENOUS
Status: DISCONTINUED | OUTPATIENT
Start: 2024-01-01 | End: 2024-01-06 | Stop reason: HOSPADM

## 2024-01-01 RX ORDER — DEXAMETHASONE SODIUM PHOSPHATE 4 MG/ML
INJECTION, SOLUTION INTRA-ARTICULAR; INTRALESIONAL; INTRAMUSCULAR; INTRAVENOUS; SOFT TISSUE PRN
Status: DISCONTINUED | OUTPATIENT
Start: 2024-01-01 | End: 2024-01-01

## 2024-01-01 RX ORDER — PROPOFOL 10 MG/ML
INJECTION, EMULSION INTRAVENOUS CONTINUOUS PRN
Status: DISCONTINUED | OUTPATIENT
Start: 2024-01-01 | End: 2024-01-01

## 2024-01-01 RX ORDER — NALOXONE HYDROCHLORIDE 0.4 MG/ML
0.4 INJECTION, SOLUTION INTRAMUSCULAR; INTRAVENOUS; SUBCUTANEOUS
Status: DISCONTINUED | OUTPATIENT
Start: 2024-01-01 | End: 2024-01-06 | Stop reason: HOSPADM

## 2024-01-01 RX ORDER — MAGNESIUM HYDROXIDE 1200 MG/15ML
LIQUID ORAL PRN
Status: DISCONTINUED | OUTPATIENT
Start: 2024-01-01 | End: 2024-01-01 | Stop reason: HOSPADM

## 2024-01-01 RX ORDER — LABETALOL HYDROCHLORIDE 5 MG/ML
10 INJECTION, SOLUTION INTRAVENOUS ONCE
Status: COMPLETED | OUTPATIENT
Start: 2024-01-01 | End: 2024-01-01

## 2024-01-01 RX ORDER — MAGNESIUM SULFATE HEPTAHYDRATE 40 MG/ML
4 INJECTION, SOLUTION INTRAVENOUS ONCE
Status: COMPLETED | OUTPATIENT
Start: 2024-01-01 | End: 2024-01-01

## 2024-01-01 RX ORDER — BUPIVACAINE HYDROCHLORIDE 5 MG/ML
INJECTION, SOLUTION EPIDURAL; INTRACAUDAL PRN
Status: DISCONTINUED | OUTPATIENT
Start: 2024-01-01 | End: 2024-01-01 | Stop reason: HOSPADM

## 2024-01-01 RX ORDER — HYDRALAZINE HYDROCHLORIDE 20 MG/ML
10 INJECTION INTRAMUSCULAR; INTRAVENOUS EVERY 4 HOURS PRN
Status: DISCONTINUED | OUTPATIENT
Start: 2024-01-01 | End: 2024-01-06 | Stop reason: HOSPADM

## 2024-01-01 RX ORDER — LIDOCAINE HYDROCHLORIDE 20 MG/ML
INJECTION, SOLUTION INFILTRATION; PERINEURAL PRN
Status: DISCONTINUED | OUTPATIENT
Start: 2024-01-01 | End: 2024-01-01

## 2024-01-01 RX ORDER — ACETAMINOPHEN 325 MG/1
650 TABLET ORAL EVERY 4 HOURS PRN
Status: DISCONTINUED | OUTPATIENT
Start: 2024-01-01 | End: 2024-01-06 | Stop reason: HOSPADM

## 2024-01-01 RX ORDER — CEFTRIAXONE 2 G/1
2 INJECTION, POWDER, FOR SOLUTION INTRAMUSCULAR; INTRAVENOUS EVERY 24 HOURS
Status: DISCONTINUED | OUTPATIENT
Start: 2024-01-01 | End: 2024-01-06 | Stop reason: HOSPADM

## 2024-01-01 RX ORDER — FENTANYL CITRATE 50 UG/ML
INJECTION, SOLUTION INTRAMUSCULAR; INTRAVENOUS PRN
Status: DISCONTINUED | OUTPATIENT
Start: 2024-01-01 | End: 2024-01-01

## 2024-01-01 RX ORDER — PROPOFOL 10 MG/ML
INJECTION, EMULSION INTRAVENOUS PRN
Status: DISCONTINUED | OUTPATIENT
Start: 2024-01-01 | End: 2024-01-01

## 2024-01-01 RX ORDER — INDOCYANINE GREEN AND WATER 25 MG
KIT INJECTION PRN
Status: DISCONTINUED | OUTPATIENT
Start: 2024-01-01 | End: 2024-01-01

## 2024-01-01 RX ORDER — OXYCODONE HYDROCHLORIDE 5 MG/1
5 TABLET ORAL EVERY 4 HOURS PRN
Status: DISCONTINUED | OUTPATIENT
Start: 2024-01-01 | End: 2024-01-06 | Stop reason: HOSPADM

## 2024-01-01 RX ORDER — NALOXONE HYDROCHLORIDE 0.4 MG/ML
0.2 INJECTION, SOLUTION INTRAMUSCULAR; INTRAVENOUS; SUBCUTANEOUS
Status: DISCONTINUED | OUTPATIENT
Start: 2024-01-01 | End: 2024-01-06 | Stop reason: HOSPADM

## 2024-01-01 RX ORDER — INDOCYANINE GREEN AND WATER 25 MG
2.5 KIT INJECTION ONCE
Status: DISCONTINUED | OUTPATIENT
Start: 2024-01-01 | End: 2024-01-01 | Stop reason: HOSPADM

## 2024-01-01 RX ORDER — ONDANSETRON 2 MG/ML
INJECTION INTRAMUSCULAR; INTRAVENOUS PRN
Status: DISCONTINUED | OUTPATIENT
Start: 2024-01-01 | End: 2024-01-01

## 2024-01-01 RX ORDER — CEFAZOLIN SODIUM/WATER 2 G/20 ML
2 SYRINGE (ML) INTRAVENOUS
Status: DISCONTINUED | OUTPATIENT
Start: 2024-01-01 | End: 2024-01-01 | Stop reason: HOSPADM

## 2024-01-01 RX ORDER — CEFAZOLIN SODIUM/WATER 2 G/20 ML
2 SYRINGE (ML) INTRAVENOUS SEE ADMIN INSTRUCTIONS
Status: DISCONTINUED | OUTPATIENT
Start: 2024-01-01 | End: 2024-01-01 | Stop reason: HOSPADM

## 2024-01-01 RX ORDER — SODIUM CHLORIDE, SODIUM LACTATE, POTASSIUM CHLORIDE, CALCIUM CHLORIDE 600; 310; 30; 20 MG/100ML; MG/100ML; MG/100ML; MG/100ML
INJECTION, SOLUTION INTRAVENOUS CONTINUOUS PRN
Status: DISCONTINUED | OUTPATIENT
Start: 2024-01-01 | End: 2024-01-01

## 2024-01-01 RX ADMIN — ONDANSETRON 4 MG: 2 INJECTION INTRAMUSCULAR; INTRAVENOUS at 18:31

## 2024-01-01 RX ADMIN — LABETALOL HYDROCHLORIDE 10 MG: 5 INJECTION, SOLUTION INTRAVENOUS at 19:51

## 2024-01-01 RX ADMIN — PHENYLEPHRINE HYDROCHLORIDE 100 MCG: 10 INJECTION INTRAVENOUS at 18:21

## 2024-01-01 RX ADMIN — PHENYLEPHRINE HYDROCHLORIDE 100 MCG: 10 INJECTION INTRAVENOUS at 18:05

## 2024-01-01 RX ADMIN — LOSARTAN POTASSIUM 25 MG: 25 TABLET, FILM COATED ORAL at 08:22

## 2024-01-01 RX ADMIN — FENTANYL CITRATE 100 MCG: 50 INJECTION INTRAMUSCULAR; INTRAVENOUS at 17:45

## 2024-01-01 RX ADMIN — PHENYLEPHRINE HYDROCHLORIDE 200 MCG: 10 INJECTION INTRAVENOUS at 17:54

## 2024-01-01 RX ADMIN — PROPOFOL 50 MCG/KG/MIN: 10 INJECTION, EMULSION INTRAVENOUS at 17:57

## 2024-01-01 RX ADMIN — INDOCYANINE GREEN AND WATER 2.5 MG: KIT at 17:54

## 2024-01-01 RX ADMIN — CEFTRIAXONE SODIUM 2 G: 2 INJECTION, POWDER, FOR SOLUTION INTRAMUSCULAR; INTRAVENOUS at 17:32

## 2024-01-01 RX ADMIN — GABAPENTIN 300 MG: 300 CAPSULE ORAL at 21:02

## 2024-01-01 RX ADMIN — HYDROMORPHONE HYDROCHLORIDE 0.5 MG: 1 INJECTION, SOLUTION INTRAMUSCULAR; INTRAVENOUS; SUBCUTANEOUS at 18:29

## 2024-01-01 RX ADMIN — CEFTRIAXONE SODIUM 2 G: 2 INJECTION, POWDER, FOR SOLUTION INTRAMUSCULAR; INTRAVENOUS at 08:12

## 2024-01-01 RX ADMIN — LIDOCAINE HYDROCHLORIDE 30 MG: 20 INJECTION, SOLUTION INFILTRATION; PERINEURAL at 17:45

## 2024-01-01 RX ADMIN — DEXAMETHASONE SODIUM PHOSPHATE 4 MG: 4 INJECTION, SOLUTION INTRA-ARTICULAR; INTRALESIONAL; INTRAMUSCULAR; INTRAVENOUS; SOFT TISSUE at 18:04

## 2024-01-01 RX ADMIN — SODIUM PHOSPHATE, MONOBASIC, MONOHYDRATE AND SODIUM PHOSPHATE, DIBASIC, ANHYDROUS 9 MMOL: 142; 276 INJECTION, SOLUTION INTRAVENOUS at 08:17

## 2024-01-01 RX ADMIN — PHENYLEPHRINE HYDROCHLORIDE 100 MCG: 10 INJECTION INTRAVENOUS at 18:28

## 2024-01-01 RX ADMIN — PIPERACILLIN AND TAZOBACTAM 3.38 G: 3; .375 INJECTION, POWDER, FOR SOLUTION INTRAVENOUS at 04:12

## 2024-01-01 RX ADMIN — MAGNESIUM SULFATE 4 G: 4 INJECTION INTRAVENOUS at 08:09

## 2024-01-01 RX ADMIN — SODIUM CHLORIDE, POTASSIUM CHLORIDE, SODIUM LACTATE AND CALCIUM CHLORIDE: 600; 310; 30; 20 INJECTION, SOLUTION INTRAVENOUS at 18:38

## 2024-01-01 RX ADMIN — PROPOFOL 150 MG: 10 INJECTION, EMULSION INTRAVENOUS at 17:45

## 2024-01-01 RX ADMIN — MIRTAZAPINE 30 MG: 15 TABLET, FILM COATED ORAL at 21:01

## 2024-01-01 RX ADMIN — DEXTROSE AND SODIUM CHLORIDE: 5; 900 INJECTION, SOLUTION INTRAVENOUS at 09:52

## 2024-01-01 RX ADMIN — TRAVOPROST 1 DROP: 0.04 SOLUTION OPHTHALMIC at 21:02

## 2024-01-01 RX ADMIN — ROCURONIUM BROMIDE 40 MG: 50 INJECTION, SOLUTION INTRAVENOUS at 17:45

## 2024-01-01 RX ADMIN — SUGAMMADEX 200 MG: 100 INJECTION, SOLUTION INTRAVENOUS at 18:36

## 2024-01-01 RX ADMIN — SODIUM CHLORIDE, POTASSIUM CHLORIDE, SODIUM LACTATE AND CALCIUM CHLORIDE: 600; 310; 30; 20 INJECTION, SOLUTION INTRAVENOUS at 17:40

## 2024-01-01 ASSESSMENT — ACTIVITIES OF DAILY LIVING (ADL)
ADLS_ACUITY_SCORE: 59
ADLS_ACUITY_SCORE: 59
ADLS_ACUITY_SCORE: 55
ADLS_ACUITY_SCORE: 59
ADLS_ACUITY_SCORE: 55
ADLS_ACUITY_SCORE: 59

## 2024-01-01 ASSESSMENT — ENCOUNTER SYMPTOMS: DYSRHYTHMIAS: 0

## 2024-01-01 NOTE — ANESTHESIA PREPROCEDURE EVALUATION
Anesthesia Pre-Procedure Evaluation    Patient: Taylor Young   MRN: [id number] : 1945        Procedure : Procedure(s):  CHOLECYSTECTOMY, LAPAROSCOPIC          No past medical history on file.   Past Surgical History:   Procedure Laterality Date    ENDOSCOPIC RETROGRADE CHOLANGIOPANCREATOGRAM N/A 2023    Procedure: Endoscopic retrograde cholangiopancreatogram, placement of biliary stent;  Surgeon: Kalli August MD;  Location: [facility] OR      No Known Allergies   Social History     Tobacco Use    Smoking status: Not on file    Smokeless tobacco: Not on file   Substance Use Topics    Alcohol use: Not on file      Wt Readings from Last 1 Encounters:   23 68.6 kg (151 lb 3.8 oz)        Anesthesia Evaluation   Pt has had prior anesthetic. Type: General.    No history of anesthetic complications       ROS/MED HX  ENT/Pulmonary:       Neurologic:     (+)          CVA,  with deficits, - Right side.                   Cardiovascular: Comment: Recent sepsis    (+)  hypertension- -  CAD (moderate calcifications on CT) -  - -                                   (-) CHF, arrhythmias and pulmonary hypertension   METS/Exercise Tolerance:     Hematologic:     (+)      anemia,          Musculoskeletal:   (+)  arthritis,             GI/Hepatic:     (+)          cholecystitis/cholelithiasis,       (-) GERD   Renal/Genitourinary:       Endo:     (+)  type II DM,                    Psychiatric/Substance Use:  - neg psychiatric ROS     Infectious Disease:  - neg infectious disease ROS     Malignancy:  - neg malignancy ROS     Other:            Physical Exam    Airway        Mallampati: II   TM distance: > 3 FB   Neck ROM: full     Respiratory Devices and Support         Dental           Cardiovascular   cardiovascular exam normal          Pulmonary   pulmonary exam normal            Other findings: The surgeon has given a verbal order transferring care of this patient to me for the performance of a regional  "analgesia block for post-op pain control. It is requested of me because I am uniquely trained and qualified to perform this block and the surgeon is neither trained nor qualified to perform this procedure.      OUTSIDE LABS:  CBC:   Lab Results   Component Value Date    WBC 3.9 (L) 01/01/2024    WBC 7.0 12/30/2023    HGB 10.1 (L) 01/01/2024    HGB 9.5 (L) 12/30/2023    HCT 30.0 (L) 01/01/2024    HCT 29.5 (L) 12/30/2023     01/01/2024     12/30/2023     BMP:   Lab Results   Component Value Date     (L) 01/01/2024     12/31/2023    POTASSIUM 3.5 01/01/2024    POTASSIUM 3.8 12/31/2023    CHLORIDE 107 01/01/2024    CHLORIDE 111 (H) 12/31/2023    CO2 18 (L) 01/01/2024    CO2 18 (L) 12/31/2023    BUN 4.2 (L) 01/01/2024    BUN 6.1 (L) 12/31/2023    CR 0.64 01/01/2024    CR 0.72 12/31/2023    GLC 96 01/01/2024    GLC 85 01/01/2024     COAGS: No results found for: \"PTT\", \"INR\", \"FIBR\"  POC: No results found for: \"BGM\", \"HCG\", \"HCGS\"  HEPATIC:   Lab Results   Component Value Date    ALBUMIN 2.3 (L) 01/01/2024    PROTTOTAL 5.3 (L) 01/01/2024     (H) 01/01/2024     (H) 01/01/2024    ALKPHOS 239 (H) 01/01/2024    BILITOTAL 1.4 (H) 01/01/2024    TINA 13 12/28/2023     OTHER:   Lab Results   Component Value Date    LACT 4.0 (HH) 12/28/2023    A1C 6.3 (H) 12/28/2023    KEARA 7.6 (L) 01/01/2024    PHOS 2.3 (L) 01/01/2024    MAG 2.5 (H) 01/01/2024    LIPASE 1,548 (H) 12/28/2023       Anesthesia Plan    ASA Status:  3       Anesthesia Type: General.              Consents            Postoperative Care            Comments:               Phan Dunlap MD    I have reviewed the pertinent notes and labs in the chart from the past 30 days and (re)examined the patient.  Any updates or changes from those notes are reflected in this note.             "

## 2024-01-01 NOTE — PROGRESS NOTES
Surgery-[place]  Chart reviewed, pt seems objectively improved. OR still has pending cases from early in the day so no practical time for non urgent cholecystectomy today, will assess in the morning.

## 2024-01-01 NOTE — PLAN OF CARE
End of Shift Summary. For vital signs and complete assessments, please see documentation flowsheets.     Pertinent assessments: Pt A&Ox4, baseline R sided hemiparesis. VSS on RA, afebrile, denied pain. Incontinent of bowel/bladder, BM x2  this shift. Slept most of the night.   Major Shift Events: phos replaced per protocol, NPO since midnight for surgery eval today  Plan (Upcoming Events): pending surgery eval, coninue IV abx  Discharge/Transfer Needs: TBD    Bedside Shift Report Completed: yes   Bedside Safety Check Completed: yes            Goal Outcome Evaluation:      Plan of Care Reviewed With: patient    Overall Patient Progress: improvingOverall Patient Progress: improving

## 2024-01-01 NOTE — PROGRESS NOTES
[facility]-[place]  Consult for gallstones in the setting of cholangitis  Seen and examined, labs reviewed.  She denies any pain or tenderness  Bili trending down, WBC 3.9K  NAD, non-toxic, appropriate  Abd flat, soft, no RUQ tenderness or Dang sign  Seems appropriate for cholecystectomy at this point given clinical improvement, though has been off Plavix for only four days.  Discussed surgery, risks, benefits and recovery.  Will plan on this intervention this afternoon but is subject to change if more pressing or urgent cases occur in the interim.  Continue NPO for now

## 2024-01-01 NOTE — PROGRESS NOTES
[facility]  Hospitalist Progress Note  Ishaan So MD 01/01/2024      Reason for Stay (Diagnosis): sepsis         Assessment and Plan:      Summary of Stay: Taylor Young is a 78 year old female with past medical history including CVA with right-sided deficits, type 2 diabetes, hypertension who resides in assisted living admitted on 12/28/2023 with severe weakness and malaise.    Here in the ER she as found to be in sinus tachycardia of 149 with stable blood pressure and tachypnea with respiratory rate of 28.  She was not hypoxic.  She did have some abdominal pain on exam but was mentating okay.  Lab workup was notable for mild hyponatremia of 131 with creatinine of 1.0, elevated transaminases with AST of 582 and ALT of 780 as well as alkaline phosphatase of 206 and bilirubin of 4.1.  Lactic acid was elevated at 4.0 with lipase of 1548.  Procalcitonin was elevated at 2.59 with leukocytosis of 16.0, hemoglobin 14 and normal platelet of 263.  Urinalysis was negative for infection.  COVID-19, influenza and RSV testing was negative.  CT PE protocol was negative for pulmonary embolism but did show evidence of likely choledocholithiasis with cholecystitis and stones within her gallbladder.  She was given IV fluids, started on empiric broad-spectrum antibiotics and GI was contacted for urgent ERCP.    She underwent ERCP on 12/29.  In the setting of chronic Plavix use sphincterotomy was not done and stone was not removed but instead stent was placed.  She was then admitted to the ICU for close hemodynamic monitoring and ongoing IV antibiotics and fluids.  The plan is for repeat ERCP for definitive stone management and stent removal in 4 to 6 weeks and in the short-term she will likely have cholecystectomy.  General surgery is following.    LFTs improving, still intermittently febrile.  Cholecystectomy pending OR availability (possibly 1/1).    Narrowed from zosyn to ceftriaxone  1/1/24    Problem List/Assessment and Plan:   Septic shock due to ascending cholangitis with choledocholithiasis, E. coli bacteremia, klebsiella bacteremia: Improving.  --Status post ERCP with stent placement on 12/28, needs repeat ERCP for definitive management of choledocholithiasis and stent removal.  --Treated w/ empiric IV Zosyn from 12/28 to 1/1  --tailored abx to ceftriaxone 1/1.  ?10 days total abx pending timing of cholecystectomy  --repeated blood cultures 12/31 given ongoing intermittent high fevers.  NGTD.  --Trend LFTs, CBC  -- General surgery following, likely cholecystectomy in the next day or 2    2.   Type 2 diabetes: Hold home metformin pending stability.  Replacing with sliding scale insulin for now.  Blood sugars low normal.    3.   History of CVA with right-sided deficits: Chronically maintained on Plavix.  This is being held for now pending completion of further procedures including cholecystectomy.    4.   History of hypertension: Chronically on losartan, ordered with fairly strict hold parameters.    5.   Elevated transaminases: Mixed pattern.  Bilirubin, transaminases and alkaline phosphatase trending down.  May have some component of gallstone pancreatitis given elevated lipase as well.  -- Treating supportively    6.  Generalized weakness: Due to above.  Anticipate need for PT and OT consult, particularly given pre-existing deficits from prior stroke    7.  Elevated D-dimer: With a negative CT PE study for clot.  Suspect this was related to inflammatory state from septic shock.  8.  Bereavement: Her son reportedly passed away of advanced liver disease and liver cancer on 12/29 at age 51.    DVT Prophylaxis: Pneumatic Compression Devices, hold off on chemical AC pending surgery  Code Status: Full Code  Discharge Dispo/Date: ?three days to home pending hemodynamics, cholecystectomy etc    Clinically Significant Risk Factors        # Hypokalemia: Lowest K = 3.3 mmol/L in last 2 days, will  "replace as needed     # Hypomagnesemia: Lowest Mg = 1.3 mg/dL in last 2 days, will replace as needed   # Hypoalbuminemia: Lowest albumin = 2.3 g/dL at 1/1/2024  5:48 AM, will monitor as appropriate            # Overweight: Estimated body mass index is 27.66 kg/m  as calculated from the following:    Height as of this encounter: 1.575 m (5' 2\").    Weight as of this encounter: 68.6 kg (151 lb 3.8 oz)., PRESENT ON ADMISSION                  Interval History (Subjective):        Afebrile overnight  Awaiting OR availability for cholecystectomy  Feels ok overall                  Physical Exam:      Last Vital Signs:  BP (!) 152/73 (BP Location: Left arm)   Pulse 69   Temp 98.7  F (37.1  C) (Temporal)   Resp 21   Ht 1.575 m (5' 2\")   Wt 68.6 kg (151 lb 3.8 oz)   SpO2 99%   BMI 27.66 kg/m        Intake/Output Summary (Last 24 hours) at 12/29/2023 1038  Last data filed at 12/28/2023 2200  Gross per 24 hour   Intake 1270 ml   Output 300 ml   Net 970 ml       General: Alert, awake, no acute distress but seems tired and somewhat ill.  HEENT: NC/AT, eyes anicteric, external occular movements intact, face symmetric.    Cardiac: RRR, S1, S2.  No murmurs appreciated.  Pulmonary: Normal chest rise, normal work of breathing.  Lungs CTA BL  Abdomen: Nondistended.  Currently nonpainful aside from mild right upper quadrant pain.  Extremities: no deformities.  Warm, well perfused.  Skin: no rashes or lesions noted.  Warm and Dry.  Neuro: Right-sided weakness.  Speech discernible, does have a fairly heavy accent.  Psych: Appropriate affect.         Medications:      All current medications were reviewed with changes reflected in problem list.         Data:      All new lab and imaging data was reviewed.   Labs:  Recent Labs   Lab 01/01/24  0548   *   POTASSIUM 3.5   CHLORIDE 107   CO2 18*   ANIONGAP 9   *   BUN 4.2*   CR 0.64   GFRESTIMATED >90   KEARA 7.6*     Recent Labs   Lab 12/30/23  0532   WBC 7.0   HGB 9.5*   HCT " 29.5*   MCV 89        Recent Labs   Lab 01/01/24  0548 12/31/23  0533 12/30/23  0532 12/29/23  0519 12/28/23  1318   * 117* 183*   < >  --    * 187* 272*   < >  --    ALKPHOS 239* 200* 155*   < >  --    BILITOTAL 1.4* 1.8* 2.4*   < >  --    TINA  --   --   --   --  13    < > = values in this interval not displayed.      Imaging:   Recent Results (from the past 48 hour(s))   CT Chest (PE) Abdomen Pelvis w Contrast    Narrative    CT CHEST PE ABDOMEN PELVIS W CONTRAST 12/28/2023 2:07 PM    CLINICAL HISTORY: tachypnea, tachycardia, elev ddimer, fever  TECHNIQUE: CT angiogram chest and routine CT abdomen pelvis with IV  contrast. Arterial phase through the chest and venous phase through  the abdomen and pelvis. 2D and 3D MIP reconstructions were performed  by the CT technologist. Dose reduction techniques were used.     CONTRAST: 60mL Isovue-370    COMPARISON: None.    FINDINGS:  ANGIOGRAM CHEST: Pulmonary arteries are normal caliber and negative  for pulmonary emboli. Thoracic aorta is negative for dissection. No CT  evidence of right heart strain.     LUNGS AND PLEURA: Trace right pleural effusion with bibasilar  atelectasis, right greater than left. No federico airspace consolidation  or pneumothorax. Calcified granulomas.    MEDIASTINUM/AXILLAE: No suspicious lymphadenopathy.    CORONARY ARTERY CALCIFICATION: Moderate.    HEPATOBILIARY: Gallbladder is distended with internal cholelithiasis,  wall thickening and surrounding fat stranding. Mild intra and  extrahepatic biliary ductal dilation to the level of the ampulla,  where there are some subtle hyperdense foci (for example series 14  images 43 and 44).    PANCREAS: No significant mass, duct dilatation, or inflammatory  change. Small duodenal diverticuli adjacent to the pancreatic head.    SPLEEN: Normal.    ADRENAL GLANDS: Normal.    KIDNEYS/BLADDER: No hydronephrosis. Urinary bladder is partially  decompressed but otherwise  unremarkable.    BOWEL: No obstruction or inflammatory change. Normal appendix. Fat and  transverse colon containing ventral hernia without evidence of acute  complication.    LYMPH NODES: No suspicious lymphadenopathy.    PELVIC ORGANS: Uterus and adnexa are unremarkable.    OTHER: Trace free fluid in the pelvis. Moderate to severe calcified  atherosclerosis.    MUSCULOSKELETAL: No acute bony abnormality.      Impression    IMPRESSION:  1.  Findings suspicious for choledocholithiasis with resultant biliary  obstruction, consider MRCP for confirmation.  2.  Cholelithiasis with findings of acute cholecystitis, could be  secondary to #1.  3.  No evidence of pulmonary embolus or other acute abnormality in the  chest.    YRN CARRION MD         SYSTEM ID:  [id number]   XR Surgery SARA L/T 5 Min Fluoro w Stills    Narrative    This exam was marked as non-reportable because it will not be read by a   radiologist or a [place] non-radiologist provider.               Ishaan So MD     I've spent 60 minutes in chart review, ordering medications and tests, obtaining additional history as needed, evaluating the patient and in documentation for this encounter.

## 2024-01-02 LAB
ALBUMIN SERPL BCG-MCNC: 2.6 G/DL (ref 3.5–5.2)
ALP SERPL-CCNC: 245 U/L (ref 40–150)
ALT SERPL W P-5'-P-CCNC: 165 U/L (ref 0–50)
ANION GAP SERPL CALCULATED.3IONS-SCNC: 9 MMOL/L (ref 7–15)
AST SERPL W P-5'-P-CCNC: 135 U/L (ref 0–45)
BILIRUB SERPL-MCNC: 0.9 MG/DL
BUN SERPL-MCNC: 6.2 MG/DL (ref 8–23)
CALCIUM SERPL-MCNC: 8.4 MG/DL (ref 8.8–10.2)
CHLORIDE SERPL-SCNC: 107 MMOL/L (ref 98–107)
CREAT SERPL-MCNC: 0.61 MG/DL (ref 0.51–0.95)
DEPRECATED HCO3 PLAS-SCNC: 20 MMOL/L (ref 22–29)
EGFRCR SERPLBLD CKD-EPI 2021: >90 ML/MIN/1.73M2
ERYTHROCYTE [DISTWIDTH] IN BLOOD BY AUTOMATED COUNT: 14.6 % (ref 10–15)
GLUCOSE BLDC GLUCOMTR-MCNC: 128 MG/DL (ref 70–99)
GLUCOSE BLDC GLUCOMTR-MCNC: 129 MG/DL (ref 70–99)
GLUCOSE BLDC GLUCOMTR-MCNC: 159 MG/DL (ref 70–99)
GLUCOSE BLDC GLUCOMTR-MCNC: 91 MG/DL (ref 70–99)
GLUCOSE SERPL-MCNC: 166 MG/DL (ref 70–99)
HCT VFR BLD AUTO: 32.6 % (ref 35–47)
HGB BLD-MCNC: 10.8 G/DL (ref 11.7–15.7)
MAGNESIUM SERPL-MCNC: 1.9 MG/DL (ref 1.7–2.3)
MCH RBC QN AUTO: 28.3 PG (ref 26.5–33)
MCHC RBC AUTO-ENTMCNC: 33.1 G/DL (ref 31.5–36.5)
MCV RBC AUTO: 85 FL (ref 78–100)
PHOSPHATE SERPL-MCNC: 3.2 MG/DL (ref 2.5–4.5)
PLATELET # BLD AUTO: 231 10E3/UL (ref 150–450)
POTASSIUM SERPL-SCNC: 4.2 MMOL/L (ref 3.4–5.3)
PROT SERPL-MCNC: 6.2 G/DL (ref 6.4–8.3)
RBC # BLD AUTO: 3.82 10E6/UL (ref 3.8–5.2)
SODIUM SERPL-SCNC: 136 MMOL/L (ref 135–145)
WBC # BLD AUTO: 6.3 10E3/UL (ref 4–11)

## 2024-01-02 PROCEDURE — 84100 ASSAY OF PHOSPHORUS: CPT | Performed by: SURGERY

## 2024-01-02 PROCEDURE — 99232 SBSQ HOSP IP/OBS MODERATE 35: CPT | Performed by: INTERNAL MEDICINE

## 2024-01-02 PROCEDURE — 85027 COMPLETE CBC AUTOMATED: CPT | Performed by: INTERNAL MEDICINE

## 2024-01-02 PROCEDURE — 250N000013 HC RX MED GY IP 250 OP 250 PS 637: Performed by: SURGERY

## 2024-01-02 PROCEDURE — 250N000013 HC RX MED GY IP 250 OP 250 PS 637: Performed by: INTERNAL MEDICINE

## 2024-01-02 PROCEDURE — 36415 COLL VENOUS BLD VENIPUNCTURE: CPT | Performed by: INTERNAL MEDICINE

## 2024-01-02 PROCEDURE — 83735 ASSAY OF MAGNESIUM: CPT | Performed by: INTERNAL MEDICINE

## 2024-01-02 PROCEDURE — 36415 COLL VENOUS BLD VENIPUNCTURE: CPT | Performed by: SURGERY

## 2024-01-02 PROCEDURE — 200N000001 HC R&B ICU

## 2024-01-02 PROCEDURE — 250N000011 HC RX IP 250 OP 636: Performed by: SURGERY

## 2024-01-02 PROCEDURE — 80053 COMPREHEN METABOLIC PANEL: CPT | Performed by: INTERNAL MEDICINE

## 2024-01-02 PROCEDURE — 250N000012 HC RX MED GY IP 250 OP 636 PS 637: Performed by: SURGERY

## 2024-01-02 RX ORDER — CLOPIDOGREL BISULFATE 75 MG/1
75 TABLET ORAL DAILY
Status: DISCONTINUED | OUTPATIENT
Start: 2024-01-02 | End: 2024-01-06 | Stop reason: HOSPADM

## 2024-01-02 RX ADMIN — LOSARTAN POTASSIUM 25 MG: 25 TABLET, FILM COATED ORAL at 09:20

## 2024-01-02 RX ADMIN — MIRTAZAPINE 30 MG: 15 TABLET, FILM COATED ORAL at 21:28

## 2024-01-02 RX ADMIN — OXYCODONE HYDROCHLORIDE 5 MG: 5 TABLET ORAL at 04:26

## 2024-01-02 RX ADMIN — CEFTRIAXONE SODIUM 2 G: 2 INJECTION, POWDER, FOR SOLUTION INTRAMUSCULAR; INTRAVENOUS at 09:20

## 2024-01-02 RX ADMIN — ACETAMINOPHEN 650 MG: 325 TABLET, FILM COATED ORAL at 00:33

## 2024-01-02 RX ADMIN — CLOPIDOGREL BISULFATE 75 MG: 75 TABLET ORAL at 09:20

## 2024-01-02 RX ADMIN — OXYCODONE HYDROCHLORIDE 5 MG: 5 TABLET ORAL at 21:28

## 2024-01-02 RX ADMIN — INSULIN ASPART 1 UNITS: 100 INJECTION, SOLUTION INTRAVENOUS; SUBCUTANEOUS at 21:37

## 2024-01-02 RX ADMIN — GABAPENTIN 300 MG: 300 CAPSULE ORAL at 21:28

## 2024-01-02 RX ADMIN — TRAVOPROST 1 DROP: 0.04 SOLUTION OPHTHALMIC at 21:29

## 2024-01-02 RX ADMIN — ACETAMINOPHEN 650 MG: 325 TABLET, FILM COATED ORAL at 11:07

## 2024-01-02 RX ADMIN — OXYCODONE HYDROCHLORIDE 5 MG: 5 TABLET ORAL at 09:20

## 2024-01-02 RX ADMIN — ACETAMINOPHEN 650 MG: 325 TABLET, FILM COATED ORAL at 03:57

## 2024-01-02 RX ADMIN — ACETAMINOPHEN 650 MG: 325 TABLET, FILM COATED ORAL at 17:00

## 2024-01-02 RX ADMIN — OXYCODONE HYDROCHLORIDE 5 MG: 5 TABLET ORAL at 14:14

## 2024-01-02 ASSESSMENT — ACTIVITIES OF DAILY LIVING (ADL)
ADLS_ACUITY_SCORE: 57
ADLS_ACUITY_SCORE: 59
ADLS_ACUITY_SCORE: 59
ADLS_ACUITY_SCORE: 53
ADLS_ACUITY_SCORE: 55
ADLS_ACUITY_SCORE: 53
ADLS_ACUITY_SCORE: 59
ADLS_ACUITY_SCORE: 59
ADLS_ACUITY_SCORE: 55
ADLS_ACUITY_SCORE: 59
ADLS_ACUITY_SCORE: 53
ADLS_ACUITY_SCORE: 53

## 2024-01-02 NOTE — ANESTHESIA POSTPROCEDURE EVALUATION
Patient: Taylor Young    Procedure: Procedure(s):  CHOLECYSTECTOMY, LAPAROSCOPIC       Anesthesia Type:  General    Note:  Disposition: Inpatient   Postop Pain Control:    PONV: No   Neuro/Psych: Uneventful            Sign Out: Acceptable/Baseline neuro status   Airway/Respiratory: Uneventful            Sign Out: Acceptable/Baseline resp. status   CV/Hemodynamics: Uneventful            Sign Out: Acceptable CV status; No obvious hypovolemia; No obvious fluid overload   Other NRE:    DID A NON-ROUTINE EVENT OCCUR? No           Last vitals:  Vitals Value Taken Time   /81 01/01/24 1920   Temp     Pulse 74 01/01/24 1950   Resp 16 01/01/24 1949   SpO2 98 % 01/01/24 1948   Vitals shown include unfiled device data.    Electronically Signed By: Chanel Rivera MD  January 1, 2024  8:01 PM

## 2024-01-02 NOTE — PROGRESS NOTES
BP slightly elevated. Dr Rivera notified. No new orders unless sys greater than 170.  LR which was infusing in OR dc'd and D5NS restared at 50mls.  Bl. Glucose 80.    BP elevated, so labetalol 10 mgs given. Dr Rivera ok for patient to go as overflow to ICU. RN flyer transported patient to room. Last /84.   RN receiving patient notified.

## 2024-01-02 NOTE — PROGRESS NOTES
[facility]  Hospitalist Progress Note  Ishaan So MD 01/02/2024      Reason for Stay (Diagnosis): sepsis         Assessment and Plan:      Summary of Stay: Taylor Young is a 78 year old female with past medical history including CVA with right-sided deficits, type 2 diabetes, hypertension who resides in assisted living admitted on 12/28/2023 with severe weakness and malaise.    Here in the ER she as found to be in sinus tachycardia of 149 with stable blood pressure and tachypnea with respiratory rate of 28.  She was not hypoxic.  She did have some abdominal pain on exam but was mentating okay.  Lab workup was notable for mild hyponatremia of 131 with creatinine of 1.0, elevated transaminases with AST of 582 and ALT of 780 as well as alkaline phosphatase of 206 and bilirubin of 4.1.  Lactic acid was elevated at 4.0 with lipase of 1548.  Procalcitonin was elevated at 2.59 with leukocytosis of 16.0, hemoglobin 14 and normal platelet of 263.  Urinalysis was negative for infection.  COVID-19, influenza and RSV testing was negative.  CT PE protocol was negative for pulmonary embolism but did show evidence of likely choledocholithiasis with cholecystitis and stones within her gallbladder.  She was given IV fluids, started on empiric broad-spectrum antibiotics and GI was contacted for urgent ERCP.    She underwent ERCP on 12/29.  In the setting of chronic Plavix use sphincterotomy was not done and stone was not removed but instead stent was placed.  She was then admitted to the ICU for close hemodynamic monitoring and ongoing IV antibiotics and fluids.  The plan is for repeat ERCP for definitive stone management and stent removal in 4 to 6 weeks and in the short-term she will likely have cholecystectomy.  General surgery is following.    LFTs improving, was intermittently febrile.      Narrowed from zosyn to ceftriaxone 1/1/24.    She underwent cholecystectomy late 1/1/2024.  Still has a  NEVIN drain in place.  Advancing diet, likely nearing discharge home.    Problem List/Assessment and Plan:   Septic shock due to ascending cholangitis with choledocholithiasis, E. coli bacteremia, klebsiella bacteremia: Improving.  --Status post ERCP with stent placement on 12/28, needs repeat ERCP for definitive management of choledocholithiasis and stent removal.  --Treated w/ empiric IV Zosyn from 12/28 to 1/1  --tailored abx to ceftriaxone 1/1.  ?10 days total abx.  Changed to p.o. upon discharge.  --repeated blood cultures 12/31 given ongoing intermittent high fevers.  NGTD.  --Trend LFTs, CBC  -- General surgery following, underwent cholecystectomy late 1/1/2024  -- Advancing to low-fat diet  -- Drain removal deferred to general surgery    2.   Type 2 diabetes: Blood sugars actually somewhat low here so holding metformin.  Continue with sliding scale insulin for now.  Blood sugars low normal.    3.   History of CVA with right-sided deficits: Chronically maintained on Plavix.  This was held for  completion of further procedures including cholecystectomy.  -- Restarted Plavix 1/2/2024    4.   History of hypertension: Chronically on losartan, ordered with fairly strict hold parameters.    5.   Elevated transaminases: Mixed pattern.  Overall bilirubin, transaminases and alkaline phosphatase trending down.  May have some component of gallstone pancreatitis given elevated lipase as well.  -- Treating supportively  -- Recheck 1/3, advancing diet.    6.  Generalized weakness: Due to above.  PT and OT consult, particularly given pre-existing deficits from prior stroke    7.  Elevated D-dimer: With a negative CT PE study for clot.  Suspect this was related to inflammatory state from septic shock.  8.  Bereavement: Her son reportedly passed away of advanced liver disease and liver cancer on 12/29 at age 51.    DVT Prophylaxis: Pneumatic Compression Devices  Code Status: Full Code  Discharge Dispo/Date: home to [facility] pending  "diet advancement, HAMIDA drain removal.  Possibly 1/3.    Clinically Significant Risk Factors            # Hypomagnesemia: Lowest Mg = 1.3 mg/dL in last 2 days, will replace as needed   # Hypoalbuminemia: Lowest albumin = 2.3 g/dL at 1/1/2024  5:48 AM, will monitor as appropriate            # Overweight: Estimated body mass index is 27.66 kg/m  as calculated from the following:    Height as of this encounter: 1.575 m (5' 2\").    Weight as of this encounter: 68.6 kg (151 lb 3.8 oz).                   Interval History (Subjective):        Hernandez wise yesterday early evening  Still has hamida drain  Tolerating clears, advance to low fat  Restart home plavix.  Working on advancing activity level.  Hopeful she can get back to her assisted living in the next day or 2.  Complaining of pain at her incision sites  Called daughter for an update.                  Physical Exam:      Last Vital Signs:  BP (!) 182/77   Pulse 63   Temp 97.4  F (36.3  C) (Oral)   Resp 13   Ht 1.575 m (5' 2\")   Wt 68.6 kg (151 lb 3.8 oz)   SpO2 100%   BMI 27.66 kg/m        Intake/Output Summary (Last 24 hours) at 12/29/2023 1038  Last data filed at 12/28/2023 2200  Gross per 24 hour   Intake 1270 ml   Output 300 ml   Net 970 ml       General: Alert, awake, no acute distress but seems tired and somewhat ill.  HEENT: NC/AT, eyes anicteric, external occular movements intact, face symmetric.    Cardiac: RRR, S1, S2.  No murmurs appreciated.  Pulmonary: Normal chest rise, normal work of breathing.  Lungs CTA BL  Abdomen: Nondistended.  Currently nonpainful aside from mild right upper quadrant pain.  Extremities: no deformities.  Warm, well perfused.  Skin: no rashes or lesions noted.  Warm and Dry.  Neuro: Right-sided weakness.  Speech discernible, does have a fairly heavy accent.  Psych: Appropriate affect.         Medications:      All current medications were reviewed with changes reflected in problem list.         Data:      All new lab and imaging " data was reviewed.   Labs:  Recent Labs   Lab 01/02/24  0821 01/02/24  0535   NA  --  136   POTASSIUM  --  4.2   CHLORIDE  --  107   CO2  --  20*   ANIONGAP  --  9   * 166*   BUN  --  6.2*   CR  --  0.61   GFRESTIMATED  --  >90   KEARA  --  8.4*     Recent Labs   Lab 01/02/24  0515   WBC 6.3   HGB 10.8*   HCT 32.6*   MCV 85        Recent Labs   Lab 01/02/24  0535 01/01/24  0548 12/31/23  0533 12/29/23  0519 12/28/23  1318   * 112* 117*   < >  --    * 164* 187*   < >  --    ALKPHOS 245* 239* 200*   < >  --    BILITOTAL 0.9 1.4* 1.8*   < >  --    TINA  --   --   --   --  13    < > = values in this interval not displayed.      Imaging:   Recent Results (from the past 48 hour(s))   CT Chest (PE) Abdomen Pelvis w Contrast    Narrative    CT CHEST PE ABDOMEN PELVIS W CONTRAST 12/28/2023 2:07 PM    CLINICAL HISTORY: tachypnea, tachycardia, elev ddimer, fever  TECHNIQUE: CT angiogram chest and routine CT abdomen pelvis with IV  contrast. Arterial phase through the chest and venous phase through  the abdomen and pelvis. 2D and 3D MIP reconstructions were performed  by the CT technologist. Dose reduction techniques were used.     CONTRAST: 60mL Isovue-370    COMPARISON: None.    FINDINGS:  ANGIOGRAM CHEST: Pulmonary arteries are normal caliber and negative  for pulmonary emboli. Thoracic aorta is negative for dissection. No CT  evidence of right heart strain.     LUNGS AND PLEURA: Trace right pleural effusion with bibasilar  atelectasis, right greater than left. No federico airspace consolidation  or pneumothorax. Calcified granulomas.    MEDIASTINUM/AXILLAE: No suspicious lymphadenopathy.    CORONARY ARTERY CALCIFICATION: Moderate.    HEPATOBILIARY: Gallbladder is distended with internal cholelithiasis,  wall thickening and surrounding fat stranding. Mild intra and  extrahepatic biliary ductal dilation to the level of the ampulla,  where there are some subtle hyperdense foci (for example series  14  images 43 and 44).    PANCREAS: No significant mass, duct dilatation, or inflammatory  change. Small duodenal diverticuli adjacent to the pancreatic head.    SPLEEN: Normal.    ADRENAL GLANDS: Normal.    KIDNEYS/BLADDER: No hydronephrosis. Urinary bladder is partially  decompressed but otherwise unremarkable.    BOWEL: No obstruction or inflammatory change. Normal appendix. Fat and  transverse colon containing ventral hernia without evidence of acute  complication.    LYMPH NODES: No suspicious lymphadenopathy.    PELVIC ORGANS: Uterus and adnexa are unremarkable.    OTHER: Trace free fluid in the pelvis. Moderate to severe calcified  atherosclerosis.    MUSCULOSKELETAL: No acute bony abnormality.      Impression    IMPRESSION:  1.  Findings suspicious for choledocholithiasis with resultant biliary  obstruction, consider MRCP for confirmation.  2.  Cholelithiasis with findings of acute cholecystitis, could be  secondary to #1.  3.  No evidence of pulmonary embolus or other acute abnormality in the  chest.    YRN CARRION MD         SYSTEM ID:  [id number]   XR Surgery SARA L/T 5 Min Fluoro w Stills    Narrative    This exam was marked as non-reportable because it will not be read by a   radiologist or a [place] non-radiologist provider.               Ishaan So MD     I've spent 60 minutes in chart review, ordering medications and tests, obtaining additional history as needed, evaluating the patient and in documentation for this encounter.

## 2024-01-02 NOTE — ANESTHESIA PROCEDURE NOTES
Airway       Patient location during procedure: OR       Procedure Start/Stop Times: 1/1/2024 5:47 PM  Staff -        Anesthesiologist:  Phan Dunlap MD       CRNA: Dionne Shay APRN CRNA       Performed By: CRNA  Consent for Airway        Urgency: elective  Indications and Patient Condition       Indications for airway management: sunny-procedural       Induction type:intravenous       Mask difficulty assessment: 2 - vent by mask + OA or adjuvant +/- NMBA    Final Airway Details       Final airway type: endotracheal airway       Successful airway: ETT - single  Endotracheal Airway Details        ETT size (mm): 7.0       Cuffed: yes       Cuff volume (mL): 8       Successful intubation technique: direct laryngoscopy       DL Blade Type: MAC 4       Grade View of Cords: 1       Adjucts: stylet       Position: Right       Measured from: gums/teeth       Secured at (cm): 23       Bite block used: None    Post intubation assessment        Placement verified by: capnometry, equal breath sounds and chest rise        Number of attempts at approach: 1       Number of other approaches attempted: 0       Secured with: tape       Ease of procedure: easy       Dentition: Intact and Unchanged    Medication(s) Administered   Medication Administration Time: 1/1/2024 5:47 PM

## 2024-01-02 NOTE — PLAN OF CARE
End of Shift Summary. For vital signs and complete assessments, please see documentation flowsheets.     Pertinent assessments: Pt A&Ox4. VSS, afebrile, on RA. Abdominal lap sites x3 and NEVIN drain in place with serosanguineous output, dressing C/D/I. Tylenol and oxy given for pain with good effect. Tolerating clear liquid diet. Incontinent of bowel/bladder.  Major Shift Events: returned from PACU around 2015. Notified MD of SBP in 170's, PRN hydralazine ordered for SBP >180  Plan (Upcoming Events): pain control, IV abx, advance diet as tolerated  Discharge/Transfer Needs: TBD    Bedside Shift Report Completed: yes   Bedside Safety Check Completed: yes                Goal Outcome Evaluation:      Plan of Care Reviewed With: patient    Overall Patient Progress: improvingOverall Patient Progress: improving

## 2024-01-02 NOTE — OP NOTE
[facility] General Surgery Operative Note    Pre-operative diagnosis: Gallbladder empyema, cholangitis.   Post-operative diagnosis: same   Procedure: laparoscopic cholecystectomy   Surgeon: Jose Leon MD   Assistant(s): Jessica Del Real PA-C  The Physician Assistant was medically necessary for their expertise in prepping, camera management, suctioning, suturing and retraction.   Anesthesia: general   Estimated blood loss:  Specimen: 10 cc  gallbladder and contents               DESCRIPTION OF PROCEDURE:  The patient was taken to the operating room and placed on the table in supine position.  General endotracheal anesthesia was induced and the abdomen was prepped and draped in standard sterile fashion.  An incision above the umbilicus was made with a blade.  The incision was carried down to the fascia.  The fascia was incised in the midline with a blade.  The peritoneum was entered bluntly with a Carmalt clamp.  Two interrupted 0 Vicryl sutures were placed at the extremes of this fascial incision.  The Kallie trocar was introduced and the abdomen was insufflated with CO2.  A 5 mm trocar was placed in the subxiphoid position.  A 5 mm trocar was placed in the right upper quadrant, just below the costal margin at the midclavicular line.  Another was placed at the anterior axillary line just below the costal margin on the right.  The patient was placed in reverse Trendelenburg and right side up.  The gallbladder appeared mildly distended and with gallstones. The bile was cloudy/grossly purulent.  The fundus of the gallbladder was grasped and retracted cephalad.  The infundibulum was grasped and retracted laterally.  The peritoneum over the medial and lateral aspects of the triangle of Calot was taken down with the [place] dissector and modest amounts of Bovie electrocautery.  The cystic duct and artery were freed up from surrounding tissues.  The triangle of Calot was skeletonized revealing the  critical view of safety.  This revealed no additional ducts or vessels.  The cystic artery and duct were each clipped twice proximally, once distally and transected with the hook scissors.  The gallbladder was then removed from the liver using the hook electrocautery.  The gallbladder was passed into an Endocatch bag and removed through the umbilical trocar site.  We observed the right upper quadrant carefully for hemostasis.  Hemostasis was assured.  We irrigated with copious amounts of sterile saline and aspirated the effluent.  A The right upper quadrant trocar sites were anesthetized with local anesthetic.  Each of the trocars Fr round channel drain was threaded through our lateral port and the slit portion was tucked in the gallbladder fossa. This was anchored to the skin with a Nylon suture. The remaining ports were removed under direct visualization.  There was no bleeding from any of these sites.  The Kallie trocar was removed and the abdomen was evacuated of CO2. An additional interrupted 0 Vicryl suture was placed in the umbilical trocar site fascia.  Each of the three 0 Vicryl sutures was cinched down and tied.  The skin of the umbilical incision was anesthetized with local anesthetic.  All of the incisions were closed with interrupted 4-0 Vicryl subcuticular sutures and Dermabond.  The patient tolerated the procedure well.  Sponge and instrument counts were correct.    Jose Leon MD

## 2024-01-02 NOTE — ANESTHESIA CARE TRANSFER NOTE
Patient: Taylor Young    Procedure: Procedure(s):  CHOLECYSTECTOMY, LAPAROSCOPIC       Diagnosis: Ascending cholangitis (H28) [K83.09]  Diagnosis Additional Information: No value filed.    Anesthesia Type:   No value filed.     Note:    Oropharynx: oropharynx clear of all foreign objects and spontaneously breathing  Level of Consciousness: drowsy  Oxygen Supplementation: face mask  Level of Supplemental Oxygen (L/min / FiO2): 6  Independent Airway: airway patency satisfactory and stable  Dentition: dentition unchanged  Vital Signs Stable: post-procedure vital signs reviewed and stable  Report to RN Given: handoff report given  Patient transferred to: PACU    Handoff Report: Identifed the Patient, Identified the Reponsible Provider, Reviewed the pertinent medical history, Discussed the surgical course, Reviewed Intra-OP anesthesia mangement and issues during anesthesia, Set expectations for post-procedure period and Allowed opportunity for questions and acknowledgement of understanding  Vitals:  Vitals Value Taken Time   /73 01/01/24 1855   Temp     Pulse 79 01/01/24 1857   Resp 18 01/01/24 1857   SpO2 100 % 01/01/24 1857   Vitals shown include unfiled device data.    Electronically Signed By: ALEXEY Louie CRNA  January 1, 2024  6:59 PM

## 2024-01-02 NOTE — PROGRESS NOTES
[facility]    General Surgery Progress Note          Assessment and Plan:   Assessment:    Taylor Young is a 78 year old female 1 Day Post-Op from Procedure(s):  CHOLECYSTECTOMY, LAPAROSCOPIC         Plan:   Pain mgmt: Oxycodone, Tylenol  Diet: advance as tolerated  NEVIN: continue  Activity: as tolerated  Antibiotics: Rocephin  DVT prophylaxis: Plavix   Dispo: doing well from surgical perspective           Interval History:   Pt resting in bed.  Complains on some mild abdominal pain, well controlled.  Tolerating clears with no nausea.  Passing gas         Physical Exam:   Temp: 97.4  F (36.3  C) Temp src: Oral BP: (!) 182/77 Pulse: 63   Resp: 13   SpO2: 100 % O2 Device: None (Room air)      I/O last 3 completed shifts:  In: 2600 [I.V.:2600]  Out: 1115 [Urine:1000; Drains:115]    Constitutional: alert and no distress   Abdomen: soft, mild tenderness around incisions sites  Incisions: surgical glue in place - clean/dry/intact  Drain: thin serous output            Data:   Data   Recent Labs   Lab 01/02/24  0821 01/02/24  0535 01/02/24  0515 01/01/24  2349 01/01/24  1146 01/01/24  0816 01/01/24  0815 01/01/24  0548 12/31/23  1635 12/31/23  1302 12/31/23  0824 12/31/23  0533 12/30/23  0850 12/30/23  0532   WBC  --   --  6.3  --   --  3.9*  --   --   --   --   --   --   --  7.0   HGB  --   --  10.8*  --   --  10.1*  --   --   --   --   --   --   --  9.5*   MCV  --   --  85  --   --  86  --   --   --   --   --   --   --  89   PLT  --   --  231  --   --  190  --   --   --   --   --   --   --  171   NA  --  136  --   --   --   --   --  134*  --   --   --  137  --  136   POTASSIUM  --  4.2  --   --   --   --   --  3.5  --  3.8  --  3.3*  3.3*  --  3.5   CHLORIDE  --  107  --   --   --   --   --  107  --   --   --  111*  --  108*   CO2  --  20*  --   --   --   --   --  18*  --   --   --  18*  --  19*   BUN  --  6.2*  --   --   --   --   --  4.2*  --   --   --  6.1*  --  11.6   CR  --  0.61  --   --    --   --   --  0.64  --   --   --  0.72  --  0.77   ANIONGAP  --  9  --   --   --   --   --  9  --   --   --  8  --  9   KEARA  --  8.4*  --   --   --   --   --  7.6*  --   --   --  7.7*  --  7.7*   * 166*  --  127*   < >  --    < > 102*   < >  --    < > 144*   < > 86   ALBUMIN  --  2.6*  --   --   --   --   --  2.3*  --   --   --  2.4*  --  2.6*   PROTTOTAL  --  6.2*  --   --   --   --   --  5.3*  --   --   --  5.1*  --  5.4*   BILITOTAL  --  0.9  --   --   --   --   --  1.4*  --   --   --  1.8*  --  2.4*   ALKPHOS  --  245*  --   --   --   --   --  239*  --   --   --  200*  --  155*   ALT  --  165*  --   --   --   --   --  164*  --   --   --  187*  --  272*   AST  --  135*  --   --   --   --   --  112*  --   --   --  117*  --  183*    < > = values in this interval not displayed.        [facility]ana [facility]  Text page: [phone]  8-4 pm   After 4 pm call [phone]    Seen and agree,    Jesus Matta MD  [facility]

## 2024-01-03 LAB
ALBUMIN SERPL BCG-MCNC: 2.8 G/DL (ref 3.5–5.2)
ALP SERPL-CCNC: 226 U/L (ref 40–150)
ALT SERPL W P-5'-P-CCNC: 139 U/L (ref 0–50)
ANION GAP SERPL CALCULATED.3IONS-SCNC: 8 MMOL/L (ref 7–15)
AST SERPL W P-5'-P-CCNC: 102 U/L (ref 0–45)
BILIRUB SERPL-MCNC: 0.7 MG/DL
BUN SERPL-MCNC: 7.4 MG/DL (ref 8–23)
CALCIUM SERPL-MCNC: 8.3 MG/DL (ref 8.8–10.2)
CHLORIDE SERPL-SCNC: 111 MMOL/L (ref 98–107)
CREAT SERPL-MCNC: 0.62 MG/DL (ref 0.51–0.95)
DEPRECATED HCO3 PLAS-SCNC: 22 MMOL/L (ref 22–29)
EGFRCR SERPLBLD CKD-EPI 2021: >90 ML/MIN/1.73M2
ERYTHROCYTE [DISTWIDTH] IN BLOOD BY AUTOMATED COUNT: 14.7 % (ref 10–15)
GLUCOSE BLDC GLUCOMTR-MCNC: 105 MG/DL (ref 70–99)
GLUCOSE BLDC GLUCOMTR-MCNC: 107 MG/DL (ref 70–99)
GLUCOSE BLDC GLUCOMTR-MCNC: 117 MG/DL (ref 70–99)
GLUCOSE BLDC GLUCOMTR-MCNC: 208 MG/DL (ref 70–99)
GLUCOSE BLDC GLUCOMTR-MCNC: 76 MG/DL (ref 70–99)
GLUCOSE BLDC GLUCOMTR-MCNC: 93 MG/DL (ref 70–99)
GLUCOSE SERPL-MCNC: 106 MG/DL (ref 70–99)
HCT VFR BLD AUTO: 31 % (ref 35–47)
HGB BLD-MCNC: 10.2 G/DL (ref 11.7–15.7)
MCH RBC QN AUTO: 28.2 PG (ref 26.5–33)
MCHC RBC AUTO-ENTMCNC: 32.9 G/DL (ref 31.5–36.5)
MCV RBC AUTO: 86 FL (ref 78–100)
PATH REPORT.COMMENTS IMP SPEC: NORMAL
PATH REPORT.COMMENTS IMP SPEC: NORMAL
PATH REPORT.FINAL DX SPEC: NORMAL
PATH REPORT.GROSS SPEC: NORMAL
PATH REPORT.MICROSCOPIC SPEC OTHER STN: NORMAL
PATH REPORT.RELEVANT HX SPEC: NORMAL
PHOTO IMAGE: NORMAL
PLATELET # BLD AUTO: 288 10E3/UL (ref 150–450)
POTASSIUM SERPL-SCNC: 3.8 MMOL/L (ref 3.4–5.3)
PROT SERPL-MCNC: 5.9 G/DL (ref 6.4–8.3)
RBC # BLD AUTO: 3.62 10E6/UL (ref 3.8–5.2)
SODIUM SERPL-SCNC: 141 MMOL/L (ref 135–145)
WBC # BLD AUTO: 7.2 10E3/UL (ref 4–11)

## 2024-01-03 PROCEDURE — 250N000013 HC RX MED GY IP 250 OP 250 PS 637: Performed by: SURGERY

## 2024-01-03 PROCEDURE — 80053 COMPREHEN METABOLIC PANEL: CPT | Performed by: INTERNAL MEDICINE

## 2024-01-03 PROCEDURE — 99233 SBSQ HOSP IP/OBS HIGH 50: CPT | Performed by: INTERNAL MEDICINE

## 2024-01-03 PROCEDURE — 85027 COMPLETE CBC AUTOMATED: CPT | Performed by: INTERNAL MEDICINE

## 2024-01-03 PROCEDURE — 200N000001 HC R&B ICU

## 2024-01-03 PROCEDURE — 250N000013 HC RX MED GY IP 250 OP 250 PS 637: Performed by: INTERNAL MEDICINE

## 2024-01-03 PROCEDURE — 250N000011 HC RX IP 250 OP 636: Performed by: SURGERY

## 2024-01-03 PROCEDURE — 36415 COLL VENOUS BLD VENIPUNCTURE: CPT | Performed by: INTERNAL MEDICINE

## 2024-01-03 RX ADMIN — METFORMIN HYDROCHLORIDE 1000 MG: 500 TABLET ORAL at 18:03

## 2024-01-03 RX ADMIN — CEFTRIAXONE SODIUM 2 G: 2 INJECTION, POWDER, FOR SOLUTION INTRAMUSCULAR; INTRAVENOUS at 08:42

## 2024-01-03 RX ADMIN — TRAVOPROST 1 DROP: 0.04 SOLUTION OPHTHALMIC at 21:42

## 2024-01-03 RX ADMIN — OXYCODONE HYDROCHLORIDE 5 MG: 5 TABLET ORAL at 21:41

## 2024-01-03 RX ADMIN — OXYCODONE HYDROCHLORIDE 5 MG: 5 TABLET ORAL at 09:38

## 2024-01-03 RX ADMIN — GABAPENTIN 300 MG: 300 CAPSULE ORAL at 21:41

## 2024-01-03 RX ADMIN — MIRTAZAPINE 30 MG: 15 TABLET, FILM COATED ORAL at 21:41

## 2024-01-03 RX ADMIN — OXYCODONE HYDROCHLORIDE 5 MG: 5 TABLET ORAL at 01:30

## 2024-01-03 RX ADMIN — LOSARTAN POTASSIUM 25 MG: 25 TABLET, FILM COATED ORAL at 08:42

## 2024-01-03 RX ADMIN — INSULIN ASPART 2 UNITS: 100 INJECTION, SOLUTION INTRAVENOUS; SUBCUTANEOUS at 12:00

## 2024-01-03 RX ADMIN — CLOPIDOGREL BISULFATE 75 MG: 75 TABLET ORAL at 08:42

## 2024-01-03 RX ADMIN — OXYCODONE HYDROCHLORIDE 5 MG: 5 TABLET ORAL at 15:08

## 2024-01-03 RX ADMIN — ACETAMINOPHEN 650 MG: 325 TABLET, FILM COATED ORAL at 19:48

## 2024-01-03 ASSESSMENT — ACTIVITIES OF DAILY LIVING (ADL)
ADLS_ACUITY_SCORE: 53
ADLS_ACUITY_SCORE: 57
ADLS_ACUITY_SCORE: 53
ADLS_ACUITY_SCORE: 57

## 2024-01-03 NOTE — PLAN OF CARE
ICU End of Shift Summary.  For vital signs and complete assessments, please see documentation flowsheets.      Pertinent assessments: AO. Afebrile. Reports pain to RUQ and RLQ improved with PRNs. BP wnl. LS clear, sats high 90s on room air. Urinating without difficulty using purewik. No BM. Good appetite. No nausea or increased pain with advanced diet.   Major Shift Events: Diet advanced and tolerating. Up to chair for several hours  Plan (Upcoming Events): Transfer to MS floor once bed available. Monitor pain.  Discharge/Transfer Needs: TBD     Bedside Shift Report Completed : Y  Bedside Safety Check Completed: Y    Goal Outcome Evaluation:      Plan of Care Reviewed With: patient    Overall Patient Progress: improvingOverall Patient Progress: improving    Outcome Evaluation: Tolerating advancing diet

## 2024-01-03 NOTE — PLAN OF CARE
Goal Outcome Evaluation:      Plan of Care Reviewed With: patient      Pt A+O X 4,able to make needs known.Oxycodone given x 2 doses with good effect. No BM. Purewick in place adequate urine output. NEVIN with serous drainage.

## 2024-01-03 NOTE — PROGRESS NOTES
[facility]    Medicine Progress Note - Hospitalist Service    Date of Admission:  12/28/2023    Assessment & Plan     Taylor Young is a 78 year old female with history pf CVA with right-sided deficits, type 2 diabetes, and hypertension who resides in assisted living. She presented to the ED on 12/28/2023 with severe weakness and malaise. ED evaluation found sinus tachycardia with heart rate of 149 with stable blood pressure and tachypnea with respiratory rate of 28.  She was not hypoxic.  She did have some abdominal pain on exam but was mentating okay.  Laboratory workup was notable for sodium 131, creatinine 1.0,  , , alkaline phosphatase 206, bilirubin 4.1, lactic acid 4.0, lipase 1548, procalcitonin 2.59, WBC 16.0, hemoglobin 14, platelets 263, and urinalysis which was not suggestive of infection.  COVID-19, influenza and RSV testing was negative.  CT chest/abdomen/pelvis with PE protocol was negative for pulmonary embolism but did show likely choledocholithiasis with cholecystitis and stones within her gallbladder.  She was given IV fluids, started on empiric broad-spectrum antibiotics and GI was contacted for urgent ERCP. She was admitted for further cares. She underwent ERCP on 12/29/23.  In the setting of chronic Plavix use sphincterotomy was not done and stone was not removed. A common bile duct stent was placed.  The plan was for repeat ERCP for definitive stone management and stent removal in 4 to 6 weeks.  She was seen by surgery and had laparoscopic cholecystectomy on 1/1/24.  Drains remained in place after surgery. Blood cultures grew E. Coli and klebsiella. Antibiotics were narrowed from Zosyn to Rocephin.     Problem list:       Septic shock due to ascending cholangitis with choledocholithiasis and cholecystitis  E. coli bacteremia and klebsiella bacteremia  S/p ERCP 12/29 with common bile duct stent placement  S/p cholecystectomy 1/1/24  Elevated LFTs,  "improved  Gall stone pancreatitis, improved  -Status post ERCP with stent placement on 12/28, needs repeat ERCP for definitive management of choledocholithiasis and stent removal 4-6 weeks after initial procedure.  -Treated with Zosyn from 12/28 to 1/1  -Antibiotics were narrowed to ceftriaxone on 1/1.  On discharge can transtion to Augmentin to complete a 14 day course of treatment to cover bacteremia with common bile duct stent.   -Repeated blood cultures 12/31 given ongoing intermittent high fevers.  NGTD.  -LFTs and CBC have improved  -Tolerating low-fat diet  -Drain removal deferred to general surgery     Type 2 diabetes  -Blood sugars were actually somewhat low so metformin was held.  BG up now so will resume PTA metformin.   -Continue Novolog sliding scale insulin for now.     History of CVA with right-sided deficits  -Chronically maintained on Plavix which was held for completion of further procedures including cholecystectomy and was restarted on 1/2/2024     Hypertension  -Continue PTA losartan with hold parameters.     Generalized weakness  -PT eval for weakness, transfers, discharge planning, etc.      Elevated D-dimer  -CT PE study was negative for clot.  Suspect this was related to inflammatory state from septic shock.    Bereavement  - Her son reportedly passed away of advanced liver disease and liver cancer on 12/29 at age 51.          Diet: Low Saturated Fat Diet    DVT Prophylaxis: Pneumatic Compression Devices  De La O Catheter: Not present  Lines: None     Cardiac Monitoring: None  Code Status: Full Code      Clinically Significant Risk Factors              # Hypoalbuminemia: Lowest albumin = 2.3 g/dL at 1/1/2024  5:48 AM, will monitor as appropriate            # Overweight: Estimated body mass index is 27.66 kg/m  as calculated from the following:    Height as of this encounter: 1.575 m (5' 2\").    Weight as of this encounter: 68.6 kg (151 lb 3.8 oz).             Disposition Plan      Expected " Discharge Date: 01/05/2024      Destination: group home              Huey Coughlin MD  Hospitalist Service  [facility]  Securely message with [employer] (more info)  Text page via [employer] Paging/Directory   ______________________________________________________________________    Interval History   No new problems. Feeling OK. Hasn't gotten out of bed much- just up to chair once    Physical Exam   Vital Signs: Temp: 97.3  F (36.3  C) Temp src: Temporal BP: 116/70 Pulse: 92     SpO2: 100 % O2 Device: None (Room air)    Weight: 151 lbs 3.77 oz    GENERAL:  Comfortable. Cooperative.  PSYCH: pleasant, oriented, No acute distress.  EYES: PERRLA, Normal conjunctiva.  HEART:  Regular rate and rhythm. No JVD. Pulses normal. No edema.  LUNGS:  Clear to auscultation, normal Respiratory effort.  ABDOMEN:  Soft, no hepatosplenomegaly, normal bowel sounds.  EXTREMETIES: No clubbing, cyanosis or ischemia  SKIN:  Dry to touch, No rash.      Medical Decision Making       60 MINUTES SPENT BY ME on the date of service doing chart review, history, exam, documentation & further activities per the note.      Data     I have personally reviewed the following data over the past 24 hrs:    7.2  \   10.2 (L)   / 288     141 111 (H) 7.4 (L) /  208 (H)   3.8 22 0.62 \     ALT: 139 (H) AST: 102 (H) AP: 226 (H) TBILI: 0.7   ALB: 2.8 (L) TOT PROTEIN: 5.9 (L) LIPASE: N/A       Imaging results reviewed over the past 24 hrs:   No results found for this or any previous visit (from the past 24 hour(s)).  Recent Labs   Lab 01/03/24  1155 01/03/24  0807 01/03/24  0506 01/02/24  0821 01/02/24  0535 01/02/24  0515 01/01/24  1146 01/01/24  0816 01/01/24  0815 01/01/24  0548 12/28/23  1520 12/28/23  1223   WBC  --   --  7.2  --   --  6.3  --  3.9*  --   --    < > 16.0*   HGB  --   --  10.2*  --   --  10.8*  --  10.1*  --   --    < > 14.2   MCV  --   --  86  --   --  85  --  86  --   --    < > 87   PLT  --   --  288  --   --  231  --   190  --   --    < > 263   NA  --   --  141  --  136  --   --   --   --  134*   < > 131*   POTASSIUM  --   --  3.8  --  4.2  --   --   --   --  3.5   < > 4.5   CHLORIDE  --   --  111*  --  107  --   --   --   --  107   < > 92*   CO2  --   --  22  --  20*  --   --   --   --  18*   < > 22   BUN  --   --  7.4*  --  6.2*  --   --   --   --  4.2*   < > 20.4   CR  --   --  0.62  --  0.61  --   --   --   --  0.64   < > 1.01*   ANIONGAP  --   --  8  --  9  --   --   --   --  9   < > 17*   KEARA  --   --  8.3*  --  8.4*  --   --   --   --  7.6*   < > 9.5   * 93 106*   < > 166*  --    < >  --    < > 102*   < > 181*   ALBUMIN  --   --  2.8*  --  2.6*  --   --   --   --  2.3*   < > 4.2   PROTTOTAL  --   --  5.9*  --  6.2*  --   --   --   --  5.3*   < > 7.9   BILITOTAL  --   --  0.7  --  0.9  --   --   --   --  1.4*   < > 4.1*   ALKPHOS  --   --  226*  --  245*  --   --   --   --  239*   < > 206*   ALT  --   --  139*  --  165*  --   --   --   --  164*   < > 780*   AST  --   --  102*  --  135*  --   --   --   --  112*   < > 582*   LIPASE  --   --   --   --   --   --   --   --   --   --   --  1,548*    < > = values in this interval not displayed.

## 2024-01-03 NOTE — PROGRESS NOTES
[facility]    General Surgery Progress Note          Assessment and Plan:   Assessment:    Taylor Young is a 78 year old female 2 Days Post-Op from Procedure(s):  CHOLECYSTECTOMY, LAPAROSCOPIC   S/P ERCP with stent 12/29      Plan:   Pain mgmt: Oxycodone, Tylenol  Diet: advance as tolerated  NEVIN: continue, remove at discharge   Activity: as tolerated  Antibiotics: Rocephin  DVT prophylaxis: Plavix   Dispo: doing well from surgical perspective       Addendum  Seen says she does have some pain this afternoon in middle of abdomen. Tolerating diet.   NEVIN with serous drainage. Anticipate it will be removed prior to discharge  Sowmya Hart MD          Interval History:   Pt resting in bed.  Feeling better this morning.  Minimal discomfort well controlled.  Tolerating diet with nausea.          Physical Exam:   Temp: 97.3  F (36.3  C) Temp src: Temporal BP: 136/67 Pulse: 89   Resp: 14   SpO2: 100 % O2 Device: None (Room air)      I/O last 3 completed shifts:  In: 506 [P.O.:250; I.V.:256]  Out: 2110 [Urine:2000; Drains:110]    Constitutional: alert and no distress   Abdomen: soft, mild tenderness around incisions sites  Incisions: surgical glue in place - clean/dry/intact  Drain: thin serous output            Data:   Data   Recent Labs   Lab 01/03/24  0807 01/03/24  0506 01/03/24  0200 01/02/24  0821 01/02/24  0535 01/02/24  0515 01/01/24  1146 01/01/24  0816 01/01/24  0815 01/01/24  0548   WBC  --  7.2  --   --   --  6.3  --  3.9*  --   --    HGB  --  10.2*  --   --   --  10.8*  --  10.1*  --   --    MCV  --  86  --   --   --  85  --  86  --   --    PLT  --  288  --   --   --  231  --  190  --   --    NA  --  141  --   --  136  --   --   --   --  134*   POTASSIUM  --  3.8  --   --  4.2  --   --   --   --  3.5   CHLORIDE  --  111*  --   --  107  --   --   --   --  107   CO2  --  22  --   --  20*  --   --   --   --  18*   BUN  --  7.4*  --   --  6.2*  --   --   --   --  4.2*   CR  --  0.62  --    --  0.61  --   --   --   --  0.64   ANIONGAP  --  8  --   --  9  --   --   --   --  9   KEARA  --  8.3*  --   --  8.4*  --   --   --   --  7.6*   GLC 93 106* 105*   < > 166*  --    < >  --    < > 102*   ALBUMIN  --  2.8*  --   --  2.6*  --   --   --   --  2.3*   PROTTOTAL  --  5.9*  --   --  6.2*  --   --   --   --  5.3*   BILITOTAL  --  0.7  --   --  0.9  --   --   --   --  1.4*   ALKPHOS  --  226*  --   --  245*  --   --   --   --  239*   ALT  --  139*  --   --  165*  --   --   --   --  164*   AST  --  102*  --   --  135*  --   --   --   --  112*    < > = values in this interval not displayed.        [facility]  Text page: [phone]  8-4 pm   After 4 pm call [phone]

## 2024-01-04 ENCOUNTER — APPOINTMENT (OUTPATIENT)
Dept: PHYSICAL THERAPY | Facility: CLINIC | Age: 79
DRG: 853 | End: 2024-01-04
Attending: INTERNAL MEDICINE
Payer: COMMERCIAL

## 2024-01-04 LAB
GLUCOSE BLDC GLUCOMTR-MCNC: 106 MG/DL (ref 70–99)
GLUCOSE BLDC GLUCOMTR-MCNC: 129 MG/DL (ref 70–99)
GLUCOSE BLDC GLUCOMTR-MCNC: 136 MG/DL (ref 70–99)
GLUCOSE BLDC GLUCOMTR-MCNC: 150 MG/DL (ref 70–99)
GLUCOSE BLDC GLUCOMTR-MCNC: 92 MG/DL (ref 70–99)
MAGNESIUM SERPL-MCNC: 1.6 MG/DL (ref 1.7–2.3)
PHOSPHATE SERPL-MCNC: 2.7 MG/DL (ref 2.5–4.5)
POTASSIUM SERPL-SCNC: 4 MMOL/L (ref 3.4–5.3)

## 2024-01-04 PROCEDURE — 250N000013 HC RX MED GY IP 250 OP 250 PS 637: Performed by: SURGERY

## 2024-01-04 PROCEDURE — 250N000011 HC RX IP 250 OP 636: Performed by: SURGERY

## 2024-01-04 PROCEDURE — 97161 PT EVAL LOW COMPLEX 20 MIN: CPT | Mod: GP | Performed by: PHYSICAL THERAPIST

## 2024-01-04 PROCEDURE — 83735 ASSAY OF MAGNESIUM: CPT | Performed by: INTERNAL MEDICINE

## 2024-01-04 PROCEDURE — 250N000011 HC RX IP 250 OP 636: Performed by: INTERNAL MEDICINE

## 2024-01-04 PROCEDURE — 84132 ASSAY OF SERUM POTASSIUM: CPT | Performed by: INTERNAL MEDICINE

## 2024-01-04 PROCEDURE — 250N000013 HC RX MED GY IP 250 OP 250 PS 637: Performed by: INTERNAL MEDICINE

## 2024-01-04 PROCEDURE — 99232 SBSQ HOSP IP/OBS MODERATE 35: CPT | Performed by: INTERNAL MEDICINE

## 2024-01-04 PROCEDURE — 97530 THERAPEUTIC ACTIVITIES: CPT | Mod: GP | Performed by: PHYSICAL THERAPIST

## 2024-01-04 PROCEDURE — 84100 ASSAY OF PHOSPHORUS: CPT | Performed by: INTERNAL MEDICINE

## 2024-01-04 PROCEDURE — 120N000001 HC R&B MED SURG/OB

## 2024-01-04 PROCEDURE — 36415 COLL VENOUS BLD VENIPUNCTURE: CPT | Performed by: INTERNAL MEDICINE

## 2024-01-04 RX ORDER — CITALOPRAM HYDROBROMIDE 10 MG/1
10 TABLET ORAL DAILY
Status: DISCONTINUED | OUTPATIENT
Start: 2024-01-04 | End: 2024-01-06 | Stop reason: HOSPADM

## 2024-01-04 RX ADMIN — METFORMIN HYDROCHLORIDE 1000 MG: 500 TABLET ORAL at 17:10

## 2024-01-04 RX ADMIN — INSULIN ASPART 1 UNITS: 100 INJECTION, SOLUTION INTRAVENOUS; SUBCUTANEOUS at 13:03

## 2024-01-04 RX ADMIN — CITALOPRAM HYDROBROMIDE 10 MG: 10 TABLET ORAL at 11:10

## 2024-01-04 RX ADMIN — ACETAMINOPHEN 650 MG: 325 TABLET, FILM COATED ORAL at 03:33

## 2024-01-04 RX ADMIN — CEFTRIAXONE SODIUM 2 G: 2 INJECTION, POWDER, FOR SOLUTION INTRAMUSCULAR; INTRAVENOUS at 08:46

## 2024-01-04 RX ADMIN — ACETAMINOPHEN 650 MG: 325 TABLET, FILM COATED ORAL at 09:23

## 2024-01-04 RX ADMIN — TRAVOPROST 1 DROP: 0.04 SOLUTION OPHTHALMIC at 21:32

## 2024-01-04 RX ADMIN — MIRTAZAPINE 30 MG: 15 TABLET, FILM COATED ORAL at 21:31

## 2024-01-04 RX ADMIN — LOSARTAN POTASSIUM 25 MG: 25 TABLET, FILM COATED ORAL at 08:47

## 2024-01-04 RX ADMIN — HYDRALAZINE HYDROCHLORIDE 10 MG: 20 INJECTION, SOLUTION INTRAMUSCULAR; INTRAVENOUS at 23:29

## 2024-01-04 RX ADMIN — GABAPENTIN 300 MG: 300 CAPSULE ORAL at 21:31

## 2024-01-04 RX ADMIN — CLOPIDOGREL BISULFATE 75 MG: 75 TABLET ORAL at 08:47

## 2024-01-04 ASSESSMENT — ACTIVITIES OF DAILY LIVING (ADL)
ADLS_ACUITY_SCORE: 58
ADLS_ACUITY_SCORE: 53
ADLS_ACUITY_SCORE: 53
ADLS_ACUITY_SCORE: 54
ADLS_ACUITY_SCORE: 58
ADLS_ACUITY_SCORE: 58
ADLS_ACUITY_SCORE: 53
ADLS_ACUITY_SCORE: 58
ADLS_ACUITY_SCORE: 58
ADLS_ACUITY_SCORE: 53
ADLS_ACUITY_SCORE: 58
ADLS_ACUITY_SCORE: 53

## 2024-01-04 NOTE — PROGRESS NOTES
Shift Events: Increased pain in abdomen, improved with oxycodone PRN and ice. Low fat diet started. Metformin restarted this evening. PT consult placed. BG at 1700 in  70s, continue to monitor more closely with decreased appetite.    Neuro: WNL. Patient tired throughout the day, unmotivated to get up to the chair d/t pain.  CV:Edema in R arm. Elevated on pillows. Legs elevated.  ID: WNL. Abx continued.  Pulm: WNL.  GI: No BM. Abdominal pain at surgical sites relieved with oxycodone. Passing flatus. Denies nausea. Minimal appetite.  : Voiding w/ purewick. Adequate output.  Lines/Gtts: PIVs capped/locked. No drips. Purewick changed at 1200. NEVIN drain intact.  Skin: Incisions and dressings clean, dry, intact.   Labs: Blood sugars variable. Resumed metformin at 1800.    For vital signs and complete assessments, see documentation flowsheets.    Plan: Encourage oral intake as tolerated. Continue with pain medication and icing. Encourage activity as tolerated.    Deysi Ramachandran RN on 1/3/2024 at 6:56 PM

## 2024-01-04 NOTE — PLAN OF CARE
Goal Outcome Evaluation:      Plan of Care Reviewed With: patient    Overall Patient Progress: no changeOverall Patient Progress: no change     Pertinent assessments: A&O, VSS. Tyl, Oxy and ice packs for abd pain. Active BS, no appetite but denies nausea. Minimal oral intake despite encouragement. Lap sites c/d/I. Purewick in place. NEVIN w/ serous drainage.    Lines: piv x2    Plan (Upcoming Events): continue encouragement for oral intake, continue rocephin  Discharge/Transfer Needs: back to AL when drain pulled and diet tolerated

## 2024-01-04 NOTE — PROGRESS NOTES
[facility]    General Surgery Progress Note          Assessment and Plan:   Assessment:    Taylor Young is a 78 year old female 3 Days Post-Op from Procedure(s):  CHOLECYSTECTOMY, LAPAROSCOPIC   S/P ERCP with stent 12/29      Plan:   Pain mgmt: Oxycodone, Tylenol  Diet: advance as tolerated  NEVIN: continue, remove at discharge   Activity: as tolerated  Antibiotics: Rocephin  DVT prophylaxis: Plavix   Dispo: doing well from surgical perspective, home when cleared by Hospitalist   Seen and agree, doing well from our point of view.           Interval History:   Pt up in chair, just finishing breakfast.  Tolerating diet with no nausea.  Passing gas.         Physical Exam:   Temp: 97  F (36.1  C) Temp src: Temporal BP: (!) 147/57 Pulse: 77   Resp: 16   SpO2: 99 % O2 Device: None (Room air)      I/O last 3 completed shifts:  In: 620 [P.O.:420; I.V.:200]  Out: 1905 [Urine:1775; Drains:130]    Constitutional: alert and no distress   Abdomen: soft, non tender  Incisions: surgical glue in place - clean/dry/intact  Drain: thin serous output            Data:   Data   Recent Labs   Lab 01/04/24  0748 01/04/24  0524 01/04/24  0208 01/03/24  2133 01/03/24  0807 01/03/24  0506 01/02/24  0821 01/02/24  0535 01/02/24  0515 01/01/24  1146 01/01/24  0816 01/01/24  0815 01/01/24  0548   WBC  --   --   --   --   --  7.2  --   --  6.3  --  3.9*  --   --    HGB  --   --   --   --   --  10.2*  --   --  10.8*  --  10.1*  --   --    MCV  --   --   --   --   --  86  --   --  85  --  86  --   --    PLT  --   --   --   --   --  288  --   --  231  --  190  --   --    NA  --   --   --   --   --  141  --  136  --   --   --   --  134*   POTASSIUM  --  4.0  --   --   --  3.8  --  4.2  --   --   --   --  3.5   CHLORIDE  --   --   --   --   --  111*  --  107  --   --   --   --  107   CO2  --   --   --   --   --  22  --  20*  --   --   --   --  18*   BUN  --   --   --   --   --  7.4*  --  6.2*  --   --   --   --  4.2*   CR  --    --   --   --   --  0.62  --  0.61  --   --   --   --  0.64   ANIONGAP  --   --   --   --   --  8  --  9  --   --   --   --  9   KEARA  --   --   --   --   --  8.3*  --  8.4*  --   --   --   --  7.6*   GLC 92  --  106* 107*   < > 106*   < > 166*  --    < >  --    < > 102*   ALBUMIN  --   --   --   --   --  2.8*  --  2.6*  --   --   --   --  2.3*   PROTTOTAL  --   --   --   --   --  5.9*  --  6.2*  --   --   --   --  5.3*   BILITOTAL  --   --   --   --   --  0.7  --  0.9  --   --   --   --  1.4*   ALKPHOS  --   --   --   --   --  226*  --  245*  --   --   --   --  239*   ALT  --   --   --   --   --  139*  --  165*  --   --   --   --  164*   AST  --   --   --   --   --  102*  --  135*  --   --   --   --  112*    < > = values in this interval not displayed.        [facility] Santana [facility]  Text page: [phone]  8-4 pm   After 4 pm call [phone]

## 2024-01-04 NOTE — PROGRESS NOTES
"CLINICAL NUTRITION SERVICES  -  ASSESSMENT NOTE    Future/Additional Recommendations:   Need full NFPE and nutrition hx as able  Consider offering supplements again if PO intakes decline   Malnutrition:   % Weight Loss:  Unable to determine w/ limited hx  % Intake:  <75% for > 7 days (moderate malnutrition)  Subcutaneous Fat Loss:  Upper arm region Moderate depletion (only 1 indicator, not using as criteria)  Muscle Loss:  Temporal region Mild-Moderate depletion (only 1 indicator, not using as criteria)  Fluid Retention:  trace    Malnutrition Diagnosis: Unable to determine with limited wt hx     REASON FOR ASSESSMENT  Taylor Young is a 78 year old female seen by Registered Dietitian for [facility].    PMH of CVA, T2DM, HTN. Presents with severe weakness and malaise.     NUTRITION HISTORY    - cholecystectomy 1/1  - Spoke with patient at bedside (nursing assistant was feeding patient breakfast at time of visit), patient seemed quite occupied and not wanting to talk. However, she did say her appetite has been \"good\". Nursing assistant has been with her for two days and she said patient has had a great appetite when she is able to get fed and when someone orders her meals. Patient did not elaborate on any further nutrition history at time of visit. Patient also noted she doesn't know if she's had any recent weight loss. Did not answer when asked if she would like an oral nutritional supplement to help meet nutritional needs.    CURRENT NUTRITION ORDERS  Diet Order: Low Saturated Fat Diet  1/1 CLD  Intermittently NPO since admit for procedures    Current Intake/Tolerance:  - % intakes per flowsheets  - 7 meals ordered in the past 7 days. No meals ordered end of 12/30-1/2 d/t diet restrictions per [facility]    NUTRITION FOCUSED PHYSICAL ASSESSMENT FOR DIAGNOSING MALNUTRITION)  Yes - visual as sitting up in chair, eating, covered in blankets         Observed:    Muscle wasting (refer to documentation in " "Malnutrition section) and Subcutaneous fat loss (refer to documentation in Malnutrition section)    ANTHROPOMETRICS  Height: 5' 2\"  Weight: 54.2 kg (119 lb 7.8 oz) 12/28  BMI 21.8  Weight Status:  Normal BMI  IBW: 50 kg  % IBW: 108%  Weight History: limited data on wt hx  Wt Readings from Last 10 Encounters:   01/04/24 64.4 kg (141 lb 15.6 oz)     LABS  Mg 1.6 (L)    MEDICATIONS  Medium sliding scale insulin, metformin, remeron    ASSESSED NUTRITION NEEDS PER APPROVED PRACTICE GUIDELINES:  Dosing Weight 54.2 kg  Estimated Energy Needs: 1545-3796 kcals (25-30 Kcal/Kg)  Justification: maintenance  Estimated Protein Needs: 65-81 grams protein (1.2-1.5 g pro/Kg)  Justification: preservation of lean body mass  Estimated Fluid Needs: 1 mL/kcal or per provider pending fluid status    NUTRITION DIAGNOSIS:  Inadequate oral intake related to restricted diets as evidenced by NPO/CLD x3 days for the past week, intermittently NPO status since admit (x7 days)    NUTRITION INTERVENTIONS  Recommendations / Nutrition Prescription  See above    Implementation  Nutrition education: Not appropriate at this time due to patient condition    Nutrition Goals  Patient to consume % of nutritionally adequate meal trays TID, or the equivalent with supplements/snacks.    MONITORING AND EVALUATION:  Progress towards goals will be monitored and evaluated per protocol and Practice Guidelines    La Oliveira RD, LD  Clinical Dietitian - [facility]  "

## 2024-01-04 NOTE — PLAN OF CARE
Goal Outcome Evaluation:  Plan of Care Reviewed With: patient  Pertinent assessments: A&Ox3, VSS. On RA. Denied pain or discomfort. Assist x2 using a lift. Lap sites O/A. NEVIN in RLQ. 50mls serosanguinous out. Dry/dark healed areas on abd. Folds & under breast. RUE & RLE extremities weakness per baseline. Incontinent of bladder & bowel.         Major Shift Events: Admitted to the floor    Treatment Plan: IV Abx, Nutrition consult, Increase mobility, monitor electrolytes, NEVIN drain

## 2024-01-04 NOTE — PROGRESS NOTES
Shift Events: Worked with PT. Nutrition consult, no change to diet. Up to chair, assist of 2 pivot. Celexa added due to depressed mood.    Neuro: Depressed mood. Celexa added daily. R sided weakness baseline.  CV: Edema to R extremities. Elevated.  ID: Ceftriaxone given.  Pulm: Lung sounds clear/diminished. On room air.  GI: Refused AM meal until it was ordered for and fed to her. Ate 100% of meal. Encouraging oral intake.  : No BM. Purewick in use.   Lines/Gtts: PIV x2 saline locked. No drips.  Skin: Incisions x3 WNL. NEVIN drain in place, working. 15 mL output at 0800.  Labs: K, Mg, Phos replacement protocol. Not replaced today.    For vital signs and complete assessments, see documentation flowsheets.    Plan: Transfer to 5th floor Med/Surg. Report given to DONNELL Nieto.

## 2024-01-04 NOTE — PROGRESS NOTES
[facility]    Medicine Progress Note - Hospitalist Service    Date of Admission:  12/28/2023    Assessment & Plan   Taylor Young is a 78 year old female with history pf CVA with right-sided deficits, type 2 diabetes, and hypertension who resides in assisted living. She presented to the ED on 12/28/2023 with severe weakness and malaise. ED evaluation found sinus tachycardia with heart rate of 149 with stable blood pressure and tachypnea with respiratory rate of 28.  She was not hypoxic.  She did have some abdominal pain on exam but was mentating okay.  Laboratory workup was notable for sodium 131, creatinine 1.0,  , , alkaline phosphatase 206, bilirubin 4.1, lactic acid 4.0, lipase 1548, procalcitonin 2.59, WBC 16.0, hemoglobin 14, platelets 263, and urinalysis which was not suggestive of infection.  COVID-19, influenza and RSV testing was negative.  CT chest/abdomen/pelvis with PE protocol was negative for pulmonary embolism but did show likely choledocholithiasis with cholecystitis and stones within her gallbladder.  She was given IV fluids, started on empiric broad-spectrum antibiotics and GI was contacted for urgent ERCP. She was admitted for further cares. She underwent ERCP on 12/29/23.  In the setting of chronic Plavix use sphincterotomy was not done and stone was not removed. A common bile duct stent was placed.  The plan was for repeat ERCP for definitive stone management and stent removal in 4 to 6 weeks.  She was seen by surgery and had laparoscopic cholecystectomy on 1/1/24.  Drains remained in place after surgery. Blood cultures grew E. Coli and klebsiella. Antibiotics were narrowed from Zosyn to Rocephin.     Problem list:       Septic shock due to ascending cholangitis with choledocholithiasis and cholecystitis  E. coli bacteremia and klebsiella bacteremia  S/p ERCP 12/29 with common bile duct stent placement  S/p cholecystectomy 1/1/24  Elevated LFTs,  improved  Gall stone pancreatitis, improved  -Status post ERCP with stent placement on 12/28, needs repeat ERCP for definitive management of choledocholithiasis and stent removal 4-6 weeks after initial procedure.  -Treated with Zosyn from 12/28 to 1/1  -Antibiotics were narrowed to ceftriaxone on 1/1.  On discharge can transtion to Augmentin to complete a 14 day course of treatment to cover bacteremia with common bile duct stent.   -Repeated blood cultures 12/31 given ongoing intermittent high fevers.  NGTD.  -LFTs and CBC have improved  -Tolerating low-fat diet  -Drain removal deferred to general surgery- likely prior to discharge     Type 2 diabetes  -Blood sugars were actually somewhat low so metformin was held.  BG up now so will resume PTA metformin.   -Continue Novolog sliding scale insulin for now.     History of CVA with right-sided deficits  -Chronically maintained on Plavix which was held for completion of further procedures including cholecystectomy and was restarted on 1/2/2024     Hypertension  -Continue PTA losartan with hold parameters.     Generalized weakness  -PT eval appreciated. Needing assist of 2. Will ask SW to work on dispo- back to group home (if able to meet needs) vs TCU      Elevated D-dimer  -CT PE study was negative for clot.  Suspect this was related to inflammatory state from septic shock.    Bereavement  -Her son reportedly passed away of advanced liver disease and liver cancer on 12/29 at age 51.  -Continue PTA Remeron  -Start citalopram 10 mg daily          Diet: Low Saturated Fat Diet    DVT Prophylaxis: Pneumatic Compression Devices  De La O Catheter: Not present  Lines: None     Cardiac Monitoring: None  Code Status: Full Code      Clinically Significant Risk Factors            # Hypomagnesemia: Lowest Mg = 1.6 mg/dL in last 2 days, will replace as needed   # Hypoalbuminemia: Lowest albumin = 2.3 g/dL at 1/1/2024  5:48 AM, will monitor as appropriate            # Overweight:  "Estimated body mass index is 26.33 kg/m  as calculated from the following:    Height as of this encounter: 1.575 m (5' 2\").    Weight as of this encounter: 65.3 kg (143 lb 15.4 oz).             Disposition Plan      Expected Discharge Date: 01/05/2024      Destination: group home              Huey Coughlin MD  Hospitalist Service  [facility]  Securely message with [employer] (more info)  Text page via [employer] Paging/Directory   ______________________________________________________________________    Interval History   No new problems. Tolerating diet. Weaker than usual. Depressed mood and poor motivation per nursing    Physical Exam   Vital Signs: Temp: 98.5  F (36.9  C) Temp src: Oral BP: (!) 144/64 Pulse: 84   Resp: 20 SpO2: 98 % O2 Device: None (Room air)    Weight: 143 lbs 15.37 oz    GENERAL:  Comfortable. Cooperative.  PSYCH: pleasant, oriented, No acute distress.  EYES: PERRLA, Normal conjunctiva.  HEART:  Regular rate and rhythm. No JVD. Pulses normal. No edema.  LUNGS:  Clear to auscultation, normal Respiratory effort.  ABDOMEN:  Soft, no hepatosplenomegaly, normal bowel sounds.  EXTREMETIES: No clubbing, cyanosis or ischemia  SKIN:  Dry to touch, No rash.      Medical Decision Making       45 MINUTES SPENT BY ME on the date of service doing chart review, history, exam, documentation & further activities per the note.      Data     I have personally reviewed the following data over the past 24 hrs:    N/A  \   N/A   / N/A     N/A N/A N/A /  150 (H)   4.0 N/A N/A \       Imaging results reviewed over the past 24 hrs:   No results found for this or any previous visit (from the past 24 hour(s)).  Recent Labs   Lab 01/04/24  1302 01/04/24  0748 01/04/24  0524 01/04/24  0208 01/03/24  0807 01/03/24  0506 01/02/24  0821 01/02/24  0535 01/02/24  0515 01/01/24  1146 01/01/24  0816 01/01/24  0815 01/01/24  0548   WBC  --   --   --   --   --  7.2  --   --  6.3  --  3.9*  --   --    HGB  --   -- "   --   --   --  10.2*  --   --  10.8*  --  10.1*  --   --    MCV  --   --   --   --   --  86  --   --  85  --  86  --   --    PLT  --   --   --   --   --  288  --   --  231  --  190  --   --    NA  --   --   --   --   --  141  --  136  --   --   --   --  134*   POTASSIUM  --   --  4.0  --   --  3.8  --  4.2  --   --   --   --  3.5   CHLORIDE  --   --   --   --   --  111*  --  107  --   --   --   --  107   CO2  --   --   --   --   --  22  --  20*  --   --   --   --  18*   BUN  --   --   --   --   --  7.4*  --  6.2*  --   --   --   --  4.2*   CR  --   --   --   --   --  0.62  --  0.61  --   --   --   --  0.64   ANIONGAP  --   --   --   --   --  8  --  9  --   --   --   --  9   KEARA  --   --   --   --   --  8.3*  --  8.4*  --   --   --   --  7.6*   * 92  --  106*   < > 106*   < > 166*  --    < >  --    < > 102*   ALBUMIN  --   --   --   --   --  2.8*  --  2.6*  --   --   --   --  2.3*   PROTTOTAL  --   --   --   --   --  5.9*  --  6.2*  --   --   --   --  5.3*   BILITOTAL  --   --   --   --   --  0.7  --  0.9  --   --   --   --  1.4*   ALKPHOS  --   --   --   --   --  226*  --  245*  --   --   --   --  239*   ALT  --   --   --   --   --  139*  --  165*  --   --   --   --  164*   AST  --   --   --   --   --  102*  --  135*  --   --   --   --  112*    < > = values in this interval not displayed.

## 2024-01-04 NOTE — PROGRESS NOTES
01/04/24 1000   Appointment Info   Signing Clinician's Name / Credentials (PT) Natasha Ludwig PT   Living Environment   People in Home facility resident   Current Living Arrangements group home   Home Accessibility no concerns   Self-Care   Usual Activity Tolerance good   Current Activity Tolerance fair   Equipment Currently Used at Home grab bar, toilet;wheelchair, power   Fall history within last six months no   Activity/Exercise/Self-Care Comment Per SW pt rec A with all self cares and ADLS, A  x 1 for transfers, is non-ambulatory   General Information   Onset of Illness/Injury or Date of Surgery 01/02/24   Pertinent History of Current Problem (include personal factors and/or comorbidities that impact the POC) 78 year old female Status post ERCP with stent placement on 12/28, needs repeat ERCP for definitive management of choledocholithiasis and stent removal. with past medical history including CVA with right-sided deficits, type 2 diabetes, hypertension who resides in assisted living admitted on 12/28/2023 with severe weakness and malaise.   Existing Precautions/Restrictions fall   Cognition   Affect/Mental Status (Cognition) flat/blunted affect   Orientation Status (Cognition) oriented x 3   Follows Commands (Cognition) follows one-step commands;25-49% accuracy;delayed response/completion;physical/tactile prompts required;repetition of directions required;verbal cues/prompting required   Safety Deficit (Cognition) at risk behavior observed   Posture    Posture Forward head position;Protracted shoulders   Range of Motion (ROM)   ROM Comment dec R UE ROM, flexion contracture R hand   Strength (Manual Muscle Testing)   Strength (Manual Muscle Testing) Deficits observed during functional mobility   Strength Comments R side hemiparesis  at baseline;   Bed Mobility   Comment, (Bed Mobility) unable w/o A   Transfers   Comment, (Transfers) max A x 1 sit to partial stand, L side lean   Gait/Stairs (Locomotion)    Comment, (Gait/Stairs) non ambulatory   Balance   Balance Comments fair in sitting cues for midline, CGA to min A   Clinical Impression   Criteria for Skilled Therapeutic Intervention Yes, treatment indicated   PT Diagnosis (PT) Weakness   Influenced by the following impairments dec strength, inc need for assist with transfers   Functional limitations due to impairments impaired mobility   Clinical Presentation (PT Evaluation Complexity) evolving   Clinical Presentation Rationale clincial assessment   Clinical Decision Making (Complexity) moderate complexity   Planned Therapy Interventions (PT) bed mobility training;strengthening;transfer training;neuromuscular re-education   Risk & Benefits of therapy have been explained evaluation/treatment results reviewed;care plan/treatment goals reviewed;risks/benefits reviewed   PT Discharge Planning   PT Discharge Recommendation (DC Rec) home with assist;home with home care physical therapy   PT Rationale for DC Rec Pt appears below her baseline for mobility, declining actitivity with nursing, appears to have little motivation, mod A to complete bed mobility, req A x 2 to complete bed to chair, per chart pt receives assist x 1 with transfers will need to confirm if  can provide A x 2 if needed anticipating pt should improve with cont mobiliztion w nursing and PT, should facility not be able to provide TCU will need to be considered.

## 2024-01-05 LAB
BACTERIA BLD CULT: NO GROWTH
BACTERIA BLD CULT: NO GROWTH
GLUCOSE BLDC GLUCOMTR-MCNC: 119 MG/DL (ref 70–99)
GLUCOSE BLDC GLUCOMTR-MCNC: 127 MG/DL (ref 70–99)
GLUCOSE BLDC GLUCOMTR-MCNC: 131 MG/DL (ref 70–99)
GLUCOSE BLDC GLUCOMTR-MCNC: 147 MG/DL (ref 70–99)
GLUCOSE BLDC GLUCOMTR-MCNC: 166 MG/DL (ref 70–99)
MAGNESIUM SERPL-MCNC: 1.6 MG/DL (ref 1.7–2.3)
PHOSPHATE SERPL-MCNC: 2.8 MG/DL (ref 2.5–4.5)
POTASSIUM SERPL-SCNC: 3.8 MMOL/L (ref 3.4–5.3)

## 2024-01-05 PROCEDURE — 84100 ASSAY OF PHOSPHORUS: CPT | Performed by: INTERNAL MEDICINE

## 2024-01-05 PROCEDURE — 36415 COLL VENOUS BLD VENIPUNCTURE: CPT | Performed by: INTERNAL MEDICINE

## 2024-01-05 PROCEDURE — 120N000001 HC R&B MED SURG/OB

## 2024-01-05 PROCEDURE — 250N000013 HC RX MED GY IP 250 OP 250 PS 637: Performed by: SURGERY

## 2024-01-05 PROCEDURE — 250N000013 HC RX MED GY IP 250 OP 250 PS 637: Performed by: INTERNAL MEDICINE

## 2024-01-05 PROCEDURE — 83735 ASSAY OF MAGNESIUM: CPT | Performed by: INTERNAL MEDICINE

## 2024-01-05 PROCEDURE — 99232 SBSQ HOSP IP/OBS MODERATE 35: CPT | Performed by: INTERNAL MEDICINE

## 2024-01-05 PROCEDURE — 84132 ASSAY OF SERUM POTASSIUM: CPT | Performed by: INTERNAL MEDICINE

## 2024-01-05 PROCEDURE — 250N000011 HC RX IP 250 OP 636: Performed by: SURGERY

## 2024-01-05 RX ORDER — AMOXICILLIN 250 MG
2 CAPSULE ORAL AT BEDTIME
Status: DISCONTINUED | OUTPATIENT
Start: 2024-01-05 | End: 2024-01-06 | Stop reason: HOSPADM

## 2024-01-05 RX ADMIN — MIRTAZAPINE 30 MG: 15 TABLET, FILM COATED ORAL at 22:11

## 2024-01-05 RX ADMIN — LOSARTAN POTASSIUM 25 MG: 25 TABLET, FILM COATED ORAL at 09:11

## 2024-01-05 RX ADMIN — INSULIN ASPART 1 UNITS: 100 INJECTION, SOLUTION INTRAVENOUS; SUBCUTANEOUS at 18:22

## 2024-01-05 RX ADMIN — CITALOPRAM HYDROBROMIDE 10 MG: 10 TABLET ORAL at 09:10

## 2024-01-05 RX ADMIN — SENNOSIDES AND DOCUSATE SODIUM 2 TABLET: 50; 8.6 TABLET ORAL at 22:11

## 2024-01-05 RX ADMIN — CLOPIDOGREL BISULFATE 75 MG: 75 TABLET ORAL at 09:11

## 2024-01-05 RX ADMIN — METFORMIN HYDROCHLORIDE 1000 MG: 500 TABLET ORAL at 18:21

## 2024-01-05 RX ADMIN — METFORMIN HYDROCHLORIDE 1000 MG: 500 TABLET ORAL at 09:10

## 2024-01-05 RX ADMIN — GABAPENTIN 300 MG: 300 CAPSULE ORAL at 22:11

## 2024-01-05 RX ADMIN — TRAVOPROST 1 DROP: 0.04 SOLUTION OPHTHALMIC at 22:11

## 2024-01-05 RX ADMIN — CEFTRIAXONE SODIUM 2 G: 2 INJECTION, POWDER, FOR SOLUTION INTRAMUSCULAR; INTRAVENOUS at 09:12

## 2024-01-05 ASSESSMENT — ACTIVITIES OF DAILY LIVING (ADL)
ADLS_ACUITY_SCORE: 58
ADLS_ACUITY_SCORE: 54
ADLS_ACUITY_SCORE: 58
ADLS_ACUITY_SCORE: 54
ADLS_ACUITY_SCORE: 58
ADLS_ACUITY_SCORE: 54
ADLS_ACUITY_SCORE: 58
ADLS_ACUITY_SCORE: 54
ADLS_ACUITY_SCORE: 54

## 2024-01-05 NOTE — PROGRESS NOTES
To Do:  End of Shift Summary  For vital signs and complete assessments, please see documentation flowsheets.     Pertinent assessments: Pt is alert but disoriented to time and situation. VSS on RA, afebrile. /78, no hydralazine needed. Denies pain and nausea. Assist of 2 with lift. Repo as tolerated. Low fat diet, , 131. Needs assistance with eating, minimal appetite this shift. PIV SL. NEVIN drain removed this shift. Lap sites open to air. Rocephin given. Bed alarm on.     Major Shift Events: None.     Treatment Plan: Rocephin, Monitor BP, Hopeful discharge back to Group Home today.    Bedside Nurse: Edyta Duncan RN

## 2024-01-05 NOTE — PLAN OF CARE
End of Shift Summary  For vital signs and complete assessments, please see documentation flowsheets.     Pertinent assessments: Pt is disoriented to time and situation, writer reminded why she is here. On RA. Flat affect. Denies pain.  Minimal PO intake, low fat diet. R sided weakness at baseline. Assist x2 with lift. NEVIN drain intact & lap sites open to air, lap site above umbilicus mild erythema. Appearing hyperpigmentation, or darker healed skin under breast & skin folds. Rested most of shift.   Major Shift Events: none  Treatment Plan: Encourage PO intake, IV abx, Nutrition, NEVIN drain  Bedside Nurse: Sylvia Mcgovern RN

## 2024-01-05 NOTE — PROGRESS NOTES
Surgery-[facility]  POD#4 s/p lap luan for gallbladder empyema following ERCP with cholangitis  Seen and examined, vitals reviewed.  No acute concerns, hasn't had breakfast yet as requires assistance. Not particularly hungry but denies any pain.  No fevers,  50mL from drain  NAD,  Incisions clean and dry, NEVIN serous  Doing as anticipated  Diet ad jack  Order to remove NEVIN (RN may do)  Will follow peripherally, please let us know of any concerns.  [phone]

## 2024-01-05 NOTE — PLAN OF CARE
End of Shift Summary  For vital signs and complete assessments, please see documentation flowsheets.     Pertinent assessments: POD #4. VSS. Afebrile. LS diminished. BS hypoactive. Pt denies pain and nausea. 1+ BLE edema noted. Low Fat diet, . Ax2 with Lift to move, turning as patient tolerates. PIV - SL. NEVIN Drain with 50 ml output. Abd lap sites, KATIE , no drainage noted. Disorientated to time/situation, alarm on bed for safety. Slept well.    Major Shift Events: BP elevated 189/77, hydralazine given, recheck 147/65.    Treatment Plan: Rocephin, Monitor BP, Hopeful discharge back to Group Home today.

## 2024-01-06 VITALS
SYSTOLIC BLOOD PRESSURE: 153 MMHG | HEART RATE: 86 BPM | DIASTOLIC BLOOD PRESSURE: 59 MMHG | BODY MASS INDEX: 26.49 KG/M2 | OXYGEN SATURATION: 97 % | HEIGHT: 62 IN | WEIGHT: 143.96 LBS | TEMPERATURE: 98 F | RESPIRATION RATE: 12 BRPM

## 2024-01-06 PROBLEM — G81.91 RIGHT HEMIPARESIS (H): Status: ACTIVE | Noted: 2024-01-06

## 2024-01-06 LAB
ALBUMIN SERPL BCG-MCNC: 2.9 G/DL (ref 3.5–5.2)
ALP SERPL-CCNC: 224 U/L (ref 40–150)
ALT SERPL W P-5'-P-CCNC: 79 U/L (ref 0–50)
ANION GAP SERPL CALCULATED.3IONS-SCNC: 10 MMOL/L (ref 7–15)
AST SERPL W P-5'-P-CCNC: 51 U/L (ref 0–45)
BILIRUB SERPL-MCNC: 0.6 MG/DL
BUN SERPL-MCNC: 13 MG/DL (ref 8–23)
CALCIUM SERPL-MCNC: 9 MG/DL (ref 8.8–10.2)
CHLORIDE SERPL-SCNC: 105 MMOL/L (ref 98–107)
CREAT SERPL-MCNC: 0.61 MG/DL (ref 0.51–0.95)
DEPRECATED HCO3 PLAS-SCNC: 23 MMOL/L (ref 22–29)
EGFRCR SERPLBLD CKD-EPI 2021: >90 ML/MIN/1.73M2
ERYTHROCYTE [DISTWIDTH] IN BLOOD BY AUTOMATED COUNT: 15.3 % (ref 10–15)
GLUCOSE BLDC GLUCOMTR-MCNC: 116 MG/DL (ref 70–99)
GLUCOSE BLDC GLUCOMTR-MCNC: 121 MG/DL (ref 70–99)
GLUCOSE SERPL-MCNC: 104 MG/DL (ref 70–99)
HCT VFR BLD AUTO: 31.9 % (ref 35–47)
HGB BLD-MCNC: 10.4 G/DL (ref 11.7–15.7)
MAGNESIUM SERPL-MCNC: 1.7 MG/DL (ref 1.7–2.3)
MCH RBC QN AUTO: 28.7 PG (ref 26.5–33)
MCHC RBC AUTO-ENTMCNC: 32.6 G/DL (ref 31.5–36.5)
MCV RBC AUTO: 88 FL (ref 78–100)
PHOSPHATE SERPL-MCNC: 2.5 MG/DL (ref 2.5–4.5)
PLATELET # BLD AUTO: 494 10E3/UL (ref 150–450)
POTASSIUM SERPL-SCNC: 3.7 MMOL/L (ref 3.4–5.3)
PROT SERPL-MCNC: 6.4 G/DL (ref 6.4–8.3)
RBC # BLD AUTO: 3.63 10E6/UL (ref 3.8–5.2)
SODIUM SERPL-SCNC: 138 MMOL/L (ref 135–145)
WBC # BLD AUTO: 9.2 10E3/UL (ref 4–11)

## 2024-01-06 PROCEDURE — 80053 COMPREHEN METABOLIC PANEL: CPT | Performed by: INTERNAL MEDICINE

## 2024-01-06 PROCEDURE — 84100 ASSAY OF PHOSPHORUS: CPT | Performed by: INTERNAL MEDICINE

## 2024-01-06 PROCEDURE — 85014 HEMATOCRIT: CPT | Performed by: INTERNAL MEDICINE

## 2024-01-06 PROCEDURE — 250N000013 HC RX MED GY IP 250 OP 250 PS 637: Performed by: INTERNAL MEDICINE

## 2024-01-06 PROCEDURE — 83735 ASSAY OF MAGNESIUM: CPT | Performed by: INTERNAL MEDICINE

## 2024-01-06 PROCEDURE — 250N000011 HC RX IP 250 OP 636: Performed by: SURGERY

## 2024-01-06 PROCEDURE — 99238 HOSP IP/OBS DSCHRG MGMT 30/<: CPT | Performed by: INTERNAL MEDICINE

## 2024-01-06 PROCEDURE — 250N000013 HC RX MED GY IP 250 OP 250 PS 637: Performed by: SURGERY

## 2024-01-06 PROCEDURE — 36415 COLL VENOUS BLD VENIPUNCTURE: CPT | Performed by: INTERNAL MEDICINE

## 2024-01-06 RX ORDER — ESCITALOPRAM OXALATE 5 MG/1
5 TABLET ORAL DAILY
Qty: 30 TABLET | Refills: 0 | Status: SHIPPED | OUTPATIENT
Start: 2024-01-06

## 2024-01-06 RX ADMIN — CITALOPRAM HYDROBROMIDE 10 MG: 10 TABLET ORAL at 09:05

## 2024-01-06 RX ADMIN — CLOPIDOGREL BISULFATE 75 MG: 75 TABLET ORAL at 09:05

## 2024-01-06 RX ADMIN — METFORMIN HYDROCHLORIDE 1000 MG: 500 TABLET ORAL at 09:05

## 2024-01-06 RX ADMIN — CEFTRIAXONE SODIUM 2 G: 2 INJECTION, POWDER, FOR SOLUTION INTRAMUSCULAR; INTRAVENOUS at 09:06

## 2024-01-06 RX ADMIN — LOSARTAN POTASSIUM 25 MG: 25 TABLET, FILM COATED ORAL at 09:05

## 2024-01-06 ASSESSMENT — ACTIVITIES OF DAILY LIVING (ADL)
ADLS_ACUITY_SCORE: 54
ADLS_ACUITY_SCORE: 52
ADLS_ACUITY_SCORE: 54
ADLS_ACUITY_SCORE: 51
ADLS_ACUITY_SCORE: 52
ADLS_ACUITY_SCORE: 54
ADLS_ACUITY_SCORE: 51

## 2024-01-06 NOTE — DISCHARGE SUMMARY
[facility] Discharge Summary    Taylor Young MRN# [id number]   Age: 78 year old YOB: 1945     Date of Admission:  12/28/2023  Date of Discharge::  1/6/2024  Admitting Physician:  Loi Melo MD  Discharge Physician:  Loi Melo MD            Admission Diagnoses:   Choledocholithiasis [K80.50]  Ascending cholangitis (H28) [K83.09]  Gallstone pancreatitis [K85.10]  Septic shock (H) [A41.9, R65.21]          Discharge Diagnosis:     Principal Problem:    Septic shock (H)  Active Problems:    Choledocholithiasis    Ascending cholangitis (H28)    Gallstone pancreatitis    Right hemiparesis (H)           Procedures:   CT Chest PE protocol with abd and pelvis  CXR  ERCP with biliary stent placement  Laparoscopic cholecystectomy          Medications Prior to Admission:     Medications Prior to Admission   Medication Sig Dispense Refill Last Dose    acetaminophen (TYLENOL) 325 MG tablet Take 1-2 tablets by mouth every 4 hours as needed for pain   Unknown    atorvastatin (LIPITOR) 80 MG tablet Take 80 mg by mouth at bedtime   12/27/2023    cholecalciferol (VITAMIN D3) 125 mcg (5000 units) capsule Take 125 mcg by mouth daily   12/27/2023 at AM    clopidogrel (PLAVIX) 75 MG tablet Take 75 mg by mouth daily   12/27/2023 at AM    docusate sodium (COLACE) 100 MG capsule Take 100 mg by mouth 2 times daily as needed for constipation   Unknown    gabapentin (NEURONTIN) 300 MG capsule Take 300 mg by mouth at bedtime   12/27/2023 at 8PM    losartan (COZAAR) 25 MG tablet Take 25 mg by mouth daily   12/27/2023 at AM    melatonin 5 MG tablet Take 5 mg by mouth nightly as needed for sleep   12/27/2023 at 8PM    metFORMIN (GLUCOPHAGE) 1000 MG tablet Take 1,000 mg by mouth 2 times daily (with meals)   12/27/2023 at x2    mirtazapine (REMERON) 30 MG tablet Take 30 mg by mouth at bedtime   12/27/2023 at 8PM    nystatin (MYCOSTATIN) 844742 UNIT/GM external powder Apply topically 2 times daily        omega 3 1000 MG CAPS Take 1 capsule by mouth daily   12/27/2023 at AM    polyethylene glycol (MIRALAX) 17 GM/Dose powder Take 1 Capful by mouth daily   12/27/2023 at AM    travoprost BAK FREE (TRAVATAN Z) 0.004 % ophthalmic solution Place 1 drop into both eyes at bedtime   12/27/2023 at 8PM             Discharge Medications:     Current Discharge Medication List        START taking these medications    Details   amoxicillin-clavulanate (AUGMENTIN) 875-125 MG tablet Take 1 tablet by mouth 2 times daily for 5 days  Qty: 10 tablet, Refills: 0    Associated Diagnoses: Ascending cholangitis (H28); Choledocholithiasis      Lexapro 5 mg take 1 po daily        CONTINUE these medications which have NOT CHANGED    Details   acetaminophen (TYLENOL) 325 MG tablet Take 1-2 tablets by mouth every 4 hours as needed for pain      atorvastatin (LIPITOR) 80 MG tablet Take 80 mg by mouth at bedtime      cholecalciferol (VITAMIN D3) 125 mcg (5000 units) capsule Take 125 mcg by mouth daily      clopidogrel (PLAVIX) 75 MG tablet Take 75 mg by mouth daily      docusate sodium (COLACE) 100 MG capsule Take 100 mg by mouth 2 times daily as needed for constipation      gabapentin (NEURONTIN) 300 MG capsule Take 300 mg by mouth at bedtime      losartan (COZAAR) 25 MG tablet Take 25 mg by mouth daily      melatonin 5 MG tablet Take 5 mg by mouth nightly as needed for sleep      metFORMIN (GLUCOPHAGE) 1000 MG tablet Take 1,000 mg by mouth 2 times daily (with meals)      mirtazapine (REMERON) 30 MG tablet Take 30 mg by mouth at bedtime      nystatin (MYCOSTATIN) 888114 UNIT/GM external powder Apply topically 2 times daily      omega 3 1000 MG CAPS Take 1 capsule by mouth daily      polyethylene glycol (MIRALAX) 17 GM/Dose powder Take 1 Capful by mouth daily      travoprost BAK FREE (TRAVATAN Z) 0.004 % ophthalmic solution Place 1 drop into both eyes at bedtime                   Consultations:   Consultation during this admission received from  gastroenterology and surgery          Hospital Course:   Taylor Young is a 78 year old female with history pf CVA with right-sided deficits, type 2 diabetes, and hypertension who resides in assisted living. She presented to the ED on 12/28/2023 with severe increase in her baseline weakness and malaise.     In the ED, VS: sinus tachycardia with heart rate of 149 with stable blood pressure and tachypnea with respiratory rate of 28.  She was not hypoxic, but complained of some abdominal pain on exam but was mentating okay.    Laboratory workup was notable for sodium 131, creatinine 1.0,  , , alkaline phosphatase 206, bilirubin 4.1, lactic acid 4.0, lipase 1548, procalcitonin 2.59, WBC 16.0, hemoglobin 14, platelets 263, and urinalysis which was not suggestive of infection.  COVID-19, influenza and RSV testing was negative.    Imaging: CT chest/abdomen/pelvis with PE protocol was negative for pulmonary embolism but did show likely choledocholithiasis with cholecystitis and stones within her gallbladder.    ED Management: She was given IV fluids, started on empiric broad-spectrum antibiotics and GI was contacted for uemergent ERCP. She was admitted from the OR to ICU for further cares.     Ms. Anguiano was take urgently for ERCP on 12/29/23.  Due to her chronic Plavix use, sphincterotomy was not done and stone was not removed. Instead, a common bile duct stent was placed.  The plan is for repeat ERCP for definitive stone management and stent removal in 4 to 6 weeks.      Ms. Anne was then seen by surgery and underwent laparoscopic cholecystectomy on 1/1/24. Blood cultures grew E. Coli and klebsiella. Antibiotics were narrowed from Zosyn to Rocephin.      Problem list:  Septic shock due to ascending cholangitis with choledocholithiasis and cholecystitis  E. coli bacteremia and klebsiella bacteremia  S/p ERCP 12/29 with common bile duct stent placement  S/p cholecystectomy 1/1/24  Elevated LFTs,  improved  Gall stone pancreatitis, improved  -Status post ERCP with stent placement on 12/28, needs repeat ERCP for definitive management of choledocholithiasis and stent removal 4-6 weeks after initial procedure.  -Treated with Zosyn from 12/28 to 1/1  -Antibiotics were narrowed to ceftriaxone on 1/1.  On discharge can transtion to Augmentin to complete a 14 day course of treatment to cover bacteremia with common bile duct stent.   -Repeated blood cultures 12/31 given ongoing intermittent high fevers.  there was no growth identified on those blood cultures.      Type 2 diabetes  -Blood sugars were actually somewhat low, so metformin was held initially.    -Continue Novolog sliding scale insulin for now.  -resumed Metformin upon discharge.     History of CVA with chronic right-sided deficits  -Chronically maintained on Plavix which was held for completion of further procedures including cholecystectomy and was restarted on 1/2/2024.  This will need to be held prior to her follow up GI visit.     Hypertension  -Continue PTA losartan with hold parameters.     Generalized weakness  -PT eval appreciated. Needing assist of 2.      Elevated D-dimer  -CT PE study was negative for clot.  Suspect this was related to inflammatory state from septic shock.     Bereavement  -Her son reportedly passed away of advanced liver disease and liver cancer on 12/29 at age 51.  -Continue PTA Remeron  -Start citalopram 10 mg daily          Discharge Instructions and Follow-Up:     Discharge diet: Regular   Discharge activity: Activity as tolerated   Discharge follow-up: With [facility] in about 4-6 weeks to have the biliary stent removed and definitive sphincterotomy completed.           Discharge Disposition:     Discharged to long-term care facility      Attestation:  I have reviewed today's vital signs, notes, medications, labs and imaging.    Loi Melo MD

## 2024-01-06 NOTE — PROGRESS NOTES
Care Management Discharge Note    Discharge Date: 01/06/2024       Discharge Disposition:  [address] group home     Discharge Services:      Discharge DME:      Discharge Transportation:      Private pay costs discussed: Not applicable    Does the patient's insurance plan have a 3 day qualifying hospital stay waiver?  No      Education Provided on the Discharge Plan:  no  Persons Notified of Discharge Plans: staff and charge RN  Patient/Family in Agreement with the Plan: yes    Handoff Referral Completed: No    Additional Information:  Patient discharging back to her group home, [address], today with a transport window of [phone] via [facility] w/c. Patient, group home, staff and charge RN, NST informed.  The group [facility] fax is not working, so as is the norm a packet will be sent. MD paged to send RX's to [facility].    Valeria Stout, RN, BSN, CM  Inpatient Care Coordination  [facility]  [phone]

## 2024-01-06 NOTE — PROGRESS NOTES
[facility]    Medicine Progress Note - Hospitalist Service    Date of Admission:  12/28/2023    Assessment & Plan   Taylor Young is a 78 year old female with history pf CVA with right-sided deficits, type 2 diabetes, and hypertension who resides in assisted living. She presented to the ED on 12/28/2023 with severe weakness and malaise. ED evaluation found sinus tachycardia with heart rate of 149 with stable blood pressure and tachypnea with respiratory rate of 28.  She was not hypoxic.  She did have some abdominal pain on exam but was mentating okay.  Laboratory workup was notable for sodium 131, creatinine 1.0,  , , alkaline phosphatase 206, bilirubin 4.1, lactic acid 4.0, lipase 1548, procalcitonin 2.59, WBC 16.0, hemoglobin 14, platelets 263, and urinalysis which was not suggestive of infection.  COVID-19, influenza and RSV testing was negative.  CT chest/abdomen/pelvis with PE protocol was negative for pulmonary embolism but did show likely choledocholithiasis with cholecystitis and stones within her gallbladder.  She was given IV fluids, started on empiric broad-spectrum antibiotics and GI was contacted for urgent ERCP. She was admitted for further cares. She underwent ERCP on 12/29/23.  In the setting of chronic Plavix use sphincterotomy was not done and stone was not removed. A common bile duct stent was placed.  The plan was for repeat ERCP for definitive stone management and stent removal in 4 to 6 weeks.  She was seen by surgery and had laparoscopic cholecystectomy on 1/1/24.  Drains remained in place after surgery. Blood cultures grew E. Coli and klebsiella. Antibiotics were narrowed from Zosyn to Rocephin.     Problem list:       Septic shock due to ascending cholangitis with choledocholithiasis and cholecystitis  E. coli bacteremia and klebsiella bacteremia  S/p ERCP 12/29 with common bile duct stent placement  S/p cholecystectomy 1/1/24  Elevated LFTs,  improved  Gall stone pancreatitis, improved  -Status post ERCP with stent placement on 12/28, needs repeat ERCP for definitive management of choledocholithiasis and stent removal 4-6 weeks after initial procedure.  -Treated with Zosyn from 12/28 to 1/1  -Antibiotics were narrowed to ceftriaxone on 1/1.  On discharge can transtion to Augmentin to complete a 14 day course of treatment to cover bacteremia with common bile duct stent.   -Repeated blood cultures 12/31 given ongoing intermittent high fevers.  NGTD.  -LFTs and CBC have improved  -Tolerating low-fat diet  -Drain removal deferred to general surgery- likely prior to discharge     Type 2 diabetes  -Blood sugars were actually somewhat low so metformin was held.  BG up now so will resume PTA metformin.   -Continue Novolog sliding scale insulin for now.     History of CVA with right-sided deficits  -Chronically maintained on Plavix which was held for completion of further procedures including cholecystectomy and was restarted on 1/2/2024     Hypertension  -Continue PTA losartan with hold parameters.     Generalized weakness  -PT eval appreciated. Needing assist of 2. Will ask SW to work on dispo- back to group home (if able to meet needs) vs TCU      Elevated D-dimer  -CT PE study was negative for clot.  Suspect this was related to inflammatory state from septic shock.    Bereavement  -Her son reportedly passed away of advanced liver disease and liver cancer on 12/29 at age 51.  -Continue PTA Remeron  -Start citalopram 10 mg daily          Diet: Low Saturated Fat Diet    DVT Prophylaxis: Pneumatic Compression Devices  De [place] O Catheter: Not present  Lines: None     Cardiac Monitoring: None  Code Status: Full Code      Clinically Significant Risk Factors            # Hypomagnesemia: Lowest Mg = 1.6 mg/dL in last 2 days, will replace as needed   # Hypoalbuminemia: Lowest albumin = 2.3 g/dL at 1/1/2024  5:48 AM, will monitor as appropriate            # Overweight:  "Estimated body mass index is 26.33 kg/m  as calculated from the following:    Height as of this encounter: 1.575 m (5' 2\").    Weight as of this encounter: 65.3 kg (143 lb 15.4 oz).             Disposition Plan      Expected Discharge Date: 01/06/2024      Destination: group home              Loi Melo MD  Hospitalist Service  [facility]  Securely message with [employer] (more info)  Text page via [employer] Paging/Directory   ______________________________________________________________________    Interval History   No new problems. Tolerating diet. Weaker than usual. Depressed mood and poor motivation per nursing.    Discussed with SW and patient: plan to have pt return to her  as soon as possible, maybe tomorrow.     Physical Exam   Vital Signs: Temp: 98.9  F (37.2  C) Temp src: Oral BP: (!) 163/70 Pulse: 99   Resp: 16 SpO2: 98 % O2 Device: None (Room air)    Weight: 143 lbs 15.37 oz    GENERAL:  Comfortable. Cooperative.  Seems somewhat withdrawn.  PSYCH: pleasant, oriented, No acute distress. Soft-spoken  HEART:  Regular rate and rhythm. No JVD. Pulses normal. No edema.  LUNGS:  Clear to auscultation, normal Respiratory effort.  ABDOMEN:  Soft, no hepatosplenomegaly, normal bowel sounds.  EXTREMETIES: No clubbing, cyanosis or ischemia.  Right hemiplegia.  SKIN:  Dry to touch, No rash.      Medical Decision Making       45 MINUTES SPENT BY ME on the date of service doing chart review, history, exam, documentation & further activities per the note.      Data     I have personally reviewed the following data over the past 24 hrs:    N/A  \   N/A   / N/A     N/A N/A N/A /  147 (H)   3.8 N/A N/A \       Imaging results reviewed over the past 24 hrs:   No results found for this or any previous visit (from the past 24 hour(s)).  Recent Labs   Lab 01/05/24  1744 01/05/24  1201 01/05/24  0836 01/05/24  0740 01/04/24  0748 01/04/24  0524 01/03/24  0807 01/03/24  0506 01/02/24  0821 01/02/24  0535 " 01/02/24 0515 01/01/24  1146 01/01/24  0816 01/01/24  0815 01/01/24  0548   WBC  --   --   --   --   --   --   --  7.2  --   --  6.3  --  3.9*  --   --    HGB  --   --   --   --   --   --   --  10.2*  --   --  10.8*  --  10.1*  --   --    MCV  --   --   --   --   --   --   --  86  --   --  85  --  86  --   --    PLT  --   --   --   --   --   --   --  288  --   --  231  --  190  --   --    NA  --   --   --   --   --   --   --  141  --  136  --   --   --   --  134*   POTASSIUM  --   --   --  3.8  --  4.0  --  3.8  --  4.2  --   --   --   --  3.5   CHLORIDE  --   --   --   --   --   --   --  111*  --  107  --   --   --   --  107   CO2  --   --   --   --   --   --   --  22  --  20*  --   --   --   --  18*   BUN  --   --   --   --   --   --   --  7.4*  --  6.2*  --   --   --   --  4.2*   CR  --   --   --   --   --   --   --  0.62  --  0.61  --   --   --   --  0.64   ANIONGAP  --   --   --   --   --   --   --  8  --  9  --   --   --   --  9   KEARA  --   --   --   --   --   --   --  8.3*  --  8.4*  --   --   --   --  7.6*   * 131* 127*  --    < >  --    < > 106*   < > 166*  --    < >  --    < > 102*   ALBUMIN  --   --   --   --   --   --   --  2.8*  --  2.6*  --   --   --   --  2.3*   PROTTOTAL  --   --   --   --   --   --   --  5.9*  --  6.2*  --   --   --   --  5.3*   BILITOTAL  --   --   --   --   --   --   --  0.7  --  0.9  --   --   --   --  1.4*   ALKPHOS  --   --   --   --   --   --   --  226*  --  245*  --   --   --   --  239*   ALT  --   --   --   --   --   --   --  139*  --  165*  --   --   --   --  164*   AST  --   --   --   --   --   --   --  102*  --  135*  --   --   --   --  112*    < > = values in this interval not displayed.

## 2024-01-06 NOTE — DISCHARGE SUMMARY
Discharge Note    Patient discharged to group home via transportation service  accompanied by [facility] [address] via [employer].  IV: Discontinued  Prescriptions faxed to pharmacy.   Belongings reviewed and sent with patient.   Home medications returned to patient: NA  Equipment sent with: patient.   patient verbalizes understanding of discharge instructions. AVS given to patient.  Additional education completed? Education on low Na diet.     Bedside nurse: Edyta Duncan RN.

## 2024-01-06 NOTE — PLAN OF CARE
Goal Outcome Evaluation:    Pertinent assessments: A&Ox3, up with lift, not oob this shift. Using purewick, voiding adequately. Min appetite, family at bedside trying to help with oral intake with no success. Abd lap sites CDI, open to air. NEVIN drain removed today, dressing CDI.  and 119.     Major Shift Events NA    Treatment Plan: Awaiting discharge plan.     Bedside Nurse: Deysi Green RN

## 2024-01-06 NOTE — PLAN OF CARE
Pertinent assessments: A&O. VSS. RA. Denies pain. Incontinent of urine & stool. 2 loose mucus stools over night.     Major Shift Events NA    Treatment Plan: Awaiting discharge plan.     Bedside Nurse: Radha Downey RN

## 2024-01-06 NOTE — PLAN OF CARE
Physical Therapy Discharge Summary    Reason for therapy discharge:    Discharged to home.    Progress towards therapy goal(s). See goals on Care Plan in [facility] electronic health record for goal details.  Goals not met.  Barriers to achieving goals:   discharge from facility.    Therapy recommendation(s):    Continued therapy is recommended.  Rationale/Recommendations:  Recommend HHPT to progress independence with mobility and strength.

## 2024-02-02 ENCOUNTER — TELEPHONE (OUTPATIENT)
Dept: SURGERY | Facility: CLINIC | Age: 79
End: 2024-02-02
Payer: COMMERCIAL

## 2024-02-02 NOTE — TELEPHONE ENCOUNTER
Attempted to call patient for post op check.  No answer.  Message was left for patient to call back if they had any questions of concerns.     Jessica Del Real PA-C

## 2024-02-12 ENCOUNTER — APPOINTMENT (OUTPATIENT)
Dept: GENERAL RADIOLOGY | Facility: CLINIC | Age: 79
End: 2024-02-12
Attending: INTERNAL MEDICINE
Payer: COMMERCIAL

## 2024-02-12 ENCOUNTER — HOSPITAL ENCOUNTER (OUTPATIENT)
Facility: CLINIC | Age: 79
Discharge: HOME | End: 2024-02-12
Attending: INTERNAL MEDICINE | Admitting: INTERNAL MEDICINE
Payer: COMMERCIAL

## 2024-02-12 ENCOUNTER — ANESTHESIA EVENT (OUTPATIENT)
Dept: SURGERY | Facility: CLINIC | Age: 79
End: 2024-02-12
Payer: COMMERCIAL

## 2024-02-12 ENCOUNTER — ANESTHESIA (OUTPATIENT)
Dept: SURGERY | Facility: CLINIC | Age: 79
End: 2024-02-12
Payer: COMMERCIAL

## 2024-02-12 VITALS
HEIGHT: 62 IN | OXYGEN SATURATION: 100 % | RESPIRATION RATE: 16 BRPM | SYSTOLIC BLOOD PRESSURE: 175 MMHG | WEIGHT: 137 LBS | BODY MASS INDEX: 25.21 KG/M2 | TEMPERATURE: 96.5 F | HEART RATE: 77 BPM | DIASTOLIC BLOOD PRESSURE: 74 MMHG

## 2024-02-12 LAB
ALBUMIN SERPL BCG-MCNC: 3.9 G/DL (ref 3.5–5.2)
ALP SERPL-CCNC: 96 U/L (ref 40–150)
ALT SERPL W P-5'-P-CCNC: 25 U/L (ref 0–50)
ANION GAP SERPL CALCULATED.3IONS-SCNC: 13 MMOL/L (ref 7–15)
AST SERPL W P-5'-P-CCNC: 32 U/L (ref 0–45)
BILIRUB SERPL-MCNC: 0.4 MG/DL
BUN SERPL-MCNC: 7.6 MG/DL (ref 8–23)
CALCIUM SERPL-MCNC: 9.5 MG/DL (ref 8.8–10.2)
CHLORIDE SERPL-SCNC: 104 MMOL/L (ref 98–107)
CREAT SERPL-MCNC: 0.66 MG/DL (ref 0.51–0.95)
DEPRECATED HCO3 PLAS-SCNC: 24 MMOL/L (ref 22–29)
EGFRCR SERPLBLD CKD-EPI 2021: 90 ML/MIN/1.73M2
ERCP: NORMAL
ERYTHROCYTE [DISTWIDTH] IN BLOOD BY AUTOMATED COUNT: 14.5 % (ref 10–15)
GLUCOSE BLDC GLUCOMTR-MCNC: 128 MG/DL (ref 70–99)
GLUCOSE BLDC GLUCOMTR-MCNC: 96 MG/DL (ref 70–99)
GLUCOSE SERPL-MCNC: 105 MG/DL (ref 70–99)
HCT VFR BLD AUTO: 41.1 % (ref 35–47)
HGB BLD-MCNC: 13.1 G/DL (ref 11.7–15.7)
INR PPP: 1 (ref 0.85–1.15)
MCH RBC QN AUTO: 28.5 PG (ref 26.5–33)
MCHC RBC AUTO-ENTMCNC: 31.9 G/DL (ref 31.5–36.5)
MCV RBC AUTO: 90 FL (ref 78–100)
PLATELET # BLD AUTO: 261 10E3/UL (ref 150–450)
POTASSIUM SERPL-SCNC: 4.3 MMOL/L (ref 3.4–5.3)
PROT SERPL-MCNC: 7.2 G/DL (ref 6.4–8.3)
RBC # BLD AUTO: 4.59 10E6/UL (ref 3.8–5.2)
SODIUM SERPL-SCNC: 141 MMOL/L (ref 135–145)
WBC # BLD AUTO: 6.7 10E3/UL (ref 4–11)

## 2024-02-12 PROCEDURE — 82040 ASSAY OF SERUM ALBUMIN: CPT | Performed by: INTERNAL MEDICINE

## 2024-02-12 PROCEDURE — 85027 COMPLETE CBC AUTOMATED: CPT | Performed by: INTERNAL MEDICINE

## 2024-02-12 PROCEDURE — 82374 ASSAY BLOOD CARBON DIOXIDE: CPT | Performed by: INTERNAL MEDICINE

## 2024-02-12 PROCEDURE — C1769 GUIDE WIRE: HCPCS | Performed by: INTERNAL MEDICINE

## 2024-02-12 PROCEDURE — 82962 GLUCOSE BLOOD TEST: CPT

## 2024-02-12 PROCEDURE — C1726 CATH, BAL DIL, NON-VASCULAR: HCPCS | Performed by: INTERNAL MEDICINE

## 2024-02-12 PROCEDURE — 710N000012 HC RECOVERY PHASE 2, PER MINUTE: Performed by: INTERNAL MEDICINE

## 2024-02-12 PROCEDURE — 255N000002 HC RX 255 OP 636: Performed by: INTERNAL MEDICINE

## 2024-02-12 PROCEDURE — 74330 X-RAY BILE/PANC ENDOSCOPY: CPT | Mod: TC

## 2024-02-12 PROCEDURE — 250N000011 HC RX IP 250 OP 636: Performed by: NURSE ANESTHETIST, CERTIFIED REGISTERED

## 2024-02-12 PROCEDURE — 250N000009 HC RX 250: Performed by: NURSE ANESTHETIST, CERTIFIED REGISTERED

## 2024-02-12 PROCEDURE — 85610 PROTHROMBIN TIME: CPT | Performed by: INTERNAL MEDICINE

## 2024-02-12 PROCEDURE — 36415 COLL VENOUS BLD VENIPUNCTURE: CPT | Performed by: INTERNAL MEDICINE

## 2024-02-12 PROCEDURE — 360N000082 HC SURGERY LEVEL 2 W/ FLUORO, PER MIN: Performed by: INTERNAL MEDICINE

## 2024-02-12 PROCEDURE — 250N000009 HC RX 250: Performed by: INTERNAL MEDICINE

## 2024-02-12 PROCEDURE — 999N000141 HC STATISTIC PRE-PROCEDURE NURSING ASSESSMENT: Performed by: INTERNAL MEDICINE

## 2024-02-12 PROCEDURE — 370N000017 HC ANESTHESIA TECHNICAL FEE, PER MIN: Performed by: INTERNAL MEDICINE

## 2024-02-12 PROCEDURE — 258N000003 HC RX IP 258 OP 636: Performed by: ANESTHESIOLOGY

## 2024-02-12 PROCEDURE — 710N000009 HC RECOVERY PHASE 1, LEVEL 1, PER MIN: Performed by: INTERNAL MEDICINE

## 2024-02-12 PROCEDURE — 250N000025 HC SEVOFLURANE, PER MIN: Performed by: INTERNAL MEDICINE

## 2024-02-12 PROCEDURE — 272N000001 HC OR GENERAL SUPPLY STERILE: Performed by: INTERNAL MEDICINE

## 2024-02-12 RX ORDER — ONDANSETRON 2 MG/ML
INJECTION INTRAMUSCULAR; INTRAVENOUS PRN
Status: DISCONTINUED | OUTPATIENT
Start: 2024-02-12 | End: 2024-02-12

## 2024-02-12 RX ORDER — DEXAMETHASONE SODIUM PHOSPHATE 4 MG/ML
INJECTION, SOLUTION INTRA-ARTICULAR; INTRALESIONAL; INTRAMUSCULAR; INTRAVENOUS; SOFT TISSUE PRN
Status: DISCONTINUED | OUTPATIENT
Start: 2024-02-12 | End: 2024-02-12

## 2024-02-12 RX ORDER — SODIUM CHLORIDE, SODIUM LACTATE, POTASSIUM CHLORIDE, CALCIUM CHLORIDE 600; 310; 30; 20 MG/100ML; MG/100ML; MG/100ML; MG/100ML
INJECTION, SOLUTION INTRAVENOUS CONTINUOUS
Status: DISCONTINUED | OUTPATIENT
Start: 2024-02-12 | End: 2024-02-12 | Stop reason: HOSPADM

## 2024-02-12 RX ORDER — KETOROLAC TROMETHAMINE 15 MG/ML
15 INJECTION, SOLUTION INTRAMUSCULAR; INTRAVENOUS
Status: DISCONTINUED | OUTPATIENT
Start: 2024-02-12 | End: 2024-02-12 | Stop reason: HOSPADM

## 2024-02-12 RX ORDER — ALBUTEROL SULFATE 0.83 MG/ML
2.5 SOLUTION RESPIRATORY (INHALATION) EVERY 4 HOURS PRN
Status: DISCONTINUED | OUTPATIENT
Start: 2024-02-12 | End: 2024-02-12 | Stop reason: HOSPADM

## 2024-02-12 RX ORDER — NALOXONE HYDROCHLORIDE 0.4 MG/ML
0.4 INJECTION, SOLUTION INTRAMUSCULAR; INTRAVENOUS; SUBCUTANEOUS
Status: DISCONTINUED | OUTPATIENT
Start: 2024-02-12 | End: 2024-02-12 | Stop reason: HOSPADM

## 2024-02-12 RX ORDER — NALOXONE HYDROCHLORIDE 0.4 MG/ML
0.2 INJECTION, SOLUTION INTRAMUSCULAR; INTRAVENOUS; SUBCUTANEOUS
Status: DISCONTINUED | OUTPATIENT
Start: 2024-02-12 | End: 2024-02-12 | Stop reason: HOSPADM

## 2024-02-12 RX ORDER — ONDANSETRON 2 MG/ML
4 INJECTION INTRAMUSCULAR; INTRAVENOUS EVERY 6 HOURS PRN
Status: DISCONTINUED | OUTPATIENT
Start: 2024-02-12 | End: 2024-02-12 | Stop reason: HOSPADM

## 2024-02-12 RX ORDER — ONDANSETRON 2 MG/ML
4 INJECTION INTRAMUSCULAR; INTRAVENOUS EVERY 30 MIN PRN
Status: DISCONTINUED | OUTPATIENT
Start: 2024-02-12 | End: 2024-02-12 | Stop reason: HOSPADM

## 2024-02-12 RX ORDER — LABETALOL HYDROCHLORIDE 5 MG/ML
10 INJECTION, SOLUTION INTRAVENOUS
Status: DISCONTINUED | OUTPATIENT
Start: 2024-02-12 | End: 2024-02-12 | Stop reason: HOSPADM

## 2024-02-12 RX ORDER — INDOMETHACIN 50 MG/1
100 SUPPOSITORY RECTAL
Status: DISCONTINUED | OUTPATIENT
Start: 2024-02-12 | End: 2024-02-12 | Stop reason: HOSPADM

## 2024-02-12 RX ORDER — LIDOCAINE 40 MG/G
CREAM TOPICAL
Status: DISCONTINUED | OUTPATIENT
Start: 2024-02-12 | End: 2024-02-12 | Stop reason: HOSPADM

## 2024-02-12 RX ORDER — HYDROMORPHONE HYDROCHLORIDE 1 MG/ML
0.5 INJECTION, SOLUTION INTRAMUSCULAR; INTRAVENOUS; SUBCUTANEOUS EVERY 5 MIN PRN
Status: DISCONTINUED | OUTPATIENT
Start: 2024-02-12 | End: 2024-02-12 | Stop reason: HOSPADM

## 2024-02-12 RX ORDER — MAGNESIUM SULFATE HEPTAHYDRATE 40 MG/ML
2 INJECTION, SOLUTION INTRAVENOUS
Status: DISCONTINUED | OUTPATIENT
Start: 2024-02-12 | End: 2024-02-12 | Stop reason: HOSPADM

## 2024-02-12 RX ORDER — LIDOCAINE HYDROCHLORIDE 20 MG/ML
INJECTION, SOLUTION INFILTRATION; PERINEURAL PRN
Status: DISCONTINUED | OUTPATIENT
Start: 2024-02-12 | End: 2024-02-12

## 2024-02-12 RX ORDER — DIPHENHYDRAMINE HCL 12.5MG/5ML
12.5 LIQUID (ML) ORAL EVERY 6 HOURS PRN
Status: DISCONTINUED | OUTPATIENT
Start: 2024-02-12 | End: 2024-02-12 | Stop reason: HOSPADM

## 2024-02-12 RX ORDER — FENTANYL CITRATE 50 UG/ML
INJECTION, SOLUTION INTRAMUSCULAR; INTRAVENOUS PRN
Status: DISCONTINUED | OUTPATIENT
Start: 2024-02-12 | End: 2024-02-12

## 2024-02-12 RX ORDER — PROPOFOL 10 MG/ML
INJECTION, EMULSION INTRAVENOUS PRN
Status: DISCONTINUED | OUTPATIENT
Start: 2024-02-12 | End: 2024-02-12

## 2024-02-12 RX ORDER — FENTANYL CITRATE 50 UG/ML
50 INJECTION, SOLUTION INTRAMUSCULAR; INTRAVENOUS EVERY 5 MIN PRN
Status: DISCONTINUED | OUTPATIENT
Start: 2024-02-12 | End: 2024-02-12 | Stop reason: HOSPADM

## 2024-02-12 RX ORDER — FLUMAZENIL 0.1 MG/ML
0.2 INJECTION, SOLUTION INTRAVENOUS
Status: DISCONTINUED | OUTPATIENT
Start: 2024-02-12 | End: 2024-02-12 | Stop reason: HOSPADM

## 2024-02-12 RX ORDER — OXYCODONE HYDROCHLORIDE 5 MG/1
5 TABLET ORAL
Status: DISCONTINUED | OUTPATIENT
Start: 2024-02-12 | End: 2024-02-12 | Stop reason: HOSPADM

## 2024-02-12 RX ORDER — INDOMETHACIN 50 MG/1
SUPPOSITORY RECTAL PRN
Status: DISCONTINUED | OUTPATIENT
Start: 2024-02-12 | End: 2024-02-12 | Stop reason: HOSPADM

## 2024-02-12 RX ORDER — HYDRALAZINE HYDROCHLORIDE 20 MG/ML
5-10 INJECTION INTRAMUSCULAR; INTRAVENOUS EVERY 10 MIN PRN
Status: DISCONTINUED | OUTPATIENT
Start: 2024-02-12 | End: 2024-02-12 | Stop reason: HOSPADM

## 2024-02-12 RX ORDER — ONDANSETRON 4 MG/1
4 TABLET, ORALLY DISINTEGRATING ORAL EVERY 6 HOURS PRN
Status: DISCONTINUED | OUTPATIENT
Start: 2024-02-12 | End: 2024-02-12 | Stop reason: HOSPADM

## 2024-02-12 RX ORDER — ONDANSETRON 4 MG/1
4 TABLET, ORALLY DISINTEGRATING ORAL EVERY 30 MIN PRN
Status: DISCONTINUED | OUTPATIENT
Start: 2024-02-12 | End: 2024-02-12 | Stop reason: HOSPADM

## 2024-02-12 RX ORDER — OXYCODONE HYDROCHLORIDE 5 MG/1
10 TABLET ORAL
Status: DISCONTINUED | OUTPATIENT
Start: 2024-02-12 | End: 2024-02-12 | Stop reason: HOSPADM

## 2024-02-12 RX ORDER — DIPHENHYDRAMINE HYDROCHLORIDE 50 MG/ML
12.5 INJECTION INTRAMUSCULAR; INTRAVENOUS EVERY 6 HOURS PRN
Status: DISCONTINUED | OUTPATIENT
Start: 2024-02-12 | End: 2024-02-12 | Stop reason: HOSPADM

## 2024-02-12 RX ADMIN — DEXAMETHASONE SODIUM PHOSPHATE 8 MG: 4 INJECTION, SOLUTION INTRA-ARTICULAR; INTRALESIONAL; INTRAMUSCULAR; INTRAVENOUS; SOFT TISSUE at 13:34

## 2024-02-12 RX ADMIN — PROPOFOL 100 MG: 10 INJECTION, EMULSION INTRAVENOUS at 13:34

## 2024-02-12 RX ADMIN — SODIUM CHLORIDE, POTASSIUM CHLORIDE, SODIUM LACTATE AND CALCIUM CHLORIDE: 600; 310; 30; 20 INJECTION, SOLUTION INTRAVENOUS at 12:53

## 2024-02-12 RX ADMIN — ONDANSETRON 4 MG: 2 INJECTION INTRAMUSCULAR; INTRAVENOUS at 13:34

## 2024-02-12 RX ADMIN — FENTANYL CITRATE 50 MCG: 50 INJECTION INTRAMUSCULAR; INTRAVENOUS at 13:34

## 2024-02-12 RX ADMIN — ROCURONIUM BROMIDE 30 MG: 50 INJECTION, SOLUTION INTRAVENOUS at 13:34

## 2024-02-12 RX ADMIN — SUGAMMADEX 200 MG: 100 INJECTION, SOLUTION INTRAVENOUS at 13:58

## 2024-02-12 RX ADMIN — LIDOCAINE HYDROCHLORIDE 50 MG: 20 INJECTION, SOLUTION INFILTRATION; PERINEURAL at 13:34

## 2024-02-12 ASSESSMENT — ACTIVITIES OF DAILY LIVING (ADL)
ADLS_ACUITY_SCORE: 48
ADLS_ACUITY_SCORE: 48

## 2024-02-12 NOTE — PROGRESS NOTES
Notified MD at 1318 PM regarding  BUS 167cc preop, incontinent at baseline .      Spoke with: Dr Torres    Orders were not obtained.    Comments: rescan post op

## 2024-02-12 NOTE — ANESTHESIA PROCEDURE NOTES
Airway       Patient location during procedure: OR       Procedure Start/Stop Times: 2/12/2024 1:37 PM  Staff -        CRNA: Dina Jay APRN CRNA       Performed By: CRNAIndications and Patient Condition       Indications for airway management: sunny-procedural       Induction type:intravenous       Mask difficulty assessment: 1 - vent by mask    Final Airway Details       Final airway type: endotracheal airway       Successful airway: ETT - single  Endotracheal Airway Details        ETT size (mm): 7.0       Cuffed: yes       Cuff volume (mL): 8       Successful intubation technique: direct laryngoscopy       DL Blade Type: Duncan 2       Grade View of Cords: 1       Position: Right       Measured from: lips       Secured at (cm): 22       Bite Block used: gi bite block.    Post intubation assessment        Placement verified by: capnometry, equal breath sounds and chest rise        Number of attempts at approach: 1       Number of other approaches attempted: 0       Secured with: tape       Ease of procedure: easy       Dentition: Unchanged    Medication(s) Administered   Medication Administration Time: 2/12/2024 1:37 PM

## 2024-02-12 NOTE — ANESTHESIA PREPROCEDURE EVALUATION
Anesthesia Pre-Procedure Evaluation    Patient: Taylor Anne   MRN: [id number] : 1945        Procedure : Procedure(s):  Endoscopic retrograde cholangiopancreatography          Past Medical History:   Diagnosis Date    Cerebral artery occlusion with cerebral infarction (H)     Diabetes (H)     Hypertension       Past Surgical History:   Procedure Laterality Date    ENDOSCOPIC RETROGRADE CHOLANGIOPANCREATOGRAM N/A 2023    Procedure: Endoscopic retrograde cholangiopancreatogram, placement of biliary stent;  Surgeon: Kalli August MD;  Location: [facility] OR    LAPAROSCOPIC CHOLECYSTECTOMY N/A 2024    Procedure: CHOLECYSTECTOMY, LAPAROSCOPIC;  Surgeon: Jose Leon MD;  Location: [facility] OR      No Known Allergies   Social History     Tobacco Use    Smoking status: Never    Smokeless tobacco: Never   Substance Use Topics    Alcohol use: No      Wt Readings from Last 1 Encounters:   24 65.3 kg (143 lb 15.4 oz)        Anesthesia Evaluation   Pt has had prior anesthetic.     No history of anesthetic complications       ROS/MED HX  ENT/Pulmonary:  - neg pulmonary ROS     Neurologic:     (+)          CVA,  with deficits, - right hemiparesis.                   Cardiovascular:     (+)  hypertension- -   -  - -   Taking blood thinners                                   METS/Exercise Tolerance:     Hematologic:     (+)      anemia,          Musculoskeletal:   (+)  arthritis,             GI/Hepatic:     (+)          cholecystitis/cholelithiasis,          Renal/Genitourinary:  - neg Renal ROS     Endo:     (+)  type II DM,                    Psychiatric/Substance Use:  - neg psychiatric ROS     Infectious Disease:  - neg infectious disease ROS     Malignancy:       Other:            Physical Exam    Airway        Mallampati: II   TM distance: > 3 FB   Neck ROM: full   Mouth opening: > 3 cm    Respiratory Devices and Support         Dental       (+) Modest Abnormalities - crowns, retainers, 1  "or 2 missing teeth      Cardiovascular          Rhythm and rate: regular and normal     Pulmonary           breath sounds clear to auscultation           OUTSIDE LABS:  CBC:   Lab Results   Component Value Date    WBC 9.2 01/06/2024    WBC 7.2 01/03/2024    HGB 10.4 (L) 01/06/2024    HGB 10.2 (L) 01/03/2024    HCT 31.9 (L) 01/06/2024    HCT 31.0 (L) 01/03/2024     (H) 01/06/2024     01/03/2024     BMP:   Lab Results   Component Value Date     01/06/2024     01/03/2024    POTASSIUM 3.7 01/06/2024    POTASSIUM 3.8 01/05/2024    CHLORIDE 105 01/06/2024    CHLORIDE 111 (H) 01/03/2024    CO2 23 01/06/2024    CO2 22 01/03/2024    BUN 13.0 01/06/2024    BUN 7.4 (L) 01/03/2024    CR 0.61 01/06/2024    CR 0.62 01/03/2024     (H) 01/06/2024     (H) 01/06/2024     COAGS:   Lab Results   Component Value Date    INR 0.93 11/27/2022     POC: No results found for: \"BGM\", \"HCG\", \"HCGS\"  HEPATIC:   Lab Results   Component Value Date    ALBUMIN 2.9 (L) 01/06/2024    PROTTOTAL 6.4 01/06/2024    ALT 79 (H) 01/06/2024    AST 51 (H) 01/06/2024    ALKPHOS 224 (H) 01/06/2024    BILITOTAL 0.6 01/06/2024    TINA 13 12/28/2023     OTHER:   Lab Results   Component Value Date    LACT 4.0 (HH) 12/28/2023    A1C 6.3 (H) 12/28/2023    KEARA 9.0 01/06/2024    PHOS 2.5 01/06/2024    MAG 1.7 01/06/2024    LIPASE 1,548 (H) 12/28/2023       Anesthesia Plan    ASA Status:  3    NPO Status:  NPO Appropriate    Anesthesia Type: General.     - Airway: ETT   Induction: Intravenous.   Maintenance: Balanced.        Consents    Anesthesia Plan(s) and associated risks, benefits, and realistic alternatives discussed. Questions answered and patient/representative(s) expressed understanding.     - Discussed:     - Discussed with:  Patient      - Extended Intubation/Ventilatory Support Discussed: No.      - Patient is DNR/DNI Status: No     Use of blood products discussed: No .     Postoperative Care    Pain management: IV " analgesics.   PONV prophylaxis: Dexamethasone or Solumedrol, Ondansetron (or other 5HT-3)     Comments:               Silviano Laird MD                 # Drug Induced Platelet Defect: home medication list includes an antiplatelet medication

## 2024-02-12 NOTE — ANESTHESIA CARE TRANSFER NOTE
Patient: Taylor Anne    Procedure: Procedure(s):  Endoscopic retrograde cholangiopancreatography       Diagnosis: Choledocholithiasis [K80.50]  Diagnosis Additional Information: No value filed.    Anesthesia Type:   General     Note:    Oropharynx: oropharynx clear of all foreign objects  Level of Consciousness: awake and drowsy  Oxygen Supplementation: face mask  Level of Supplemental Oxygen (L/min / FiO2): 6  Independent Airway: airway patency satisfactory and stable  Dentition: dentition unchanged  Vital Signs Stable: post-procedure vital signs reviewed and stable  Report to RN Given: handoff report given  Patient transferred to: PACU    Handoff Report: Identifed the Patient, Identified the Reponsible Provider, Reviewed the pertinent medical history, Discussed the surgical course, Reviewed Intra-OP anesthesia mangement and issues during anesthesia, Set expectations for post-procedure period and Allowed opportunity for questions and acknowledgement of understanding      Vitals:  Vitals Value Taken Time   BP     Temp 97.2  F (36.2  C) 02/12/24 1405   Pulse 85 02/12/24 1409   Resp 24 02/12/24 1409   SpO2 100 % 02/12/24 1409   Vitals shown include unfiled device data.    Electronically Signed By: ALEXEY Arenas CRNA  February 12, 2024  2:12 PM

## 2024-02-12 NOTE — ANESTHESIA POSTPROCEDURE EVALUATION
Patient: Taylor Anne    Procedure: Procedure(s):  Endoscopic retrograde cholangiopancreatography       Anesthesia Type:  General    Note:  Disposition: Inpatient   Postop Pain Control: Uneventful            Sign Out: Well controlled pain   PONV: No   Neuro/Psych: Uneventful            Sign Out: Acceptable/Baseline neuro status   Airway/Respiratory: Uneventful            Sign Out: Acceptable/Baseline resp. status   CV/Hemodynamics: Uneventful            Sign Out: Acceptable CV status   Other NRE: NONE   DID A NON-ROUTINE EVENT OCCUR? No           Last vitals:  Vitals Value Taken Time   /79 02/12/24 1445   Temp 97.2  F (36.2  C) 02/12/24 1430   Pulse 82 02/12/24 1448   Resp 20 02/12/24 1448   SpO2 100 % 02/12/24 1448   Vitals shown include unfiled device data.    Electronically Signed By: Silviano Laird MD  February 12, 2024  2:54 PM

## 2024-02-12 NOTE — DISCHARGE INSTRUCTIONS
Restart Plavix in 3 days.  Clear liquids for 24 hours.    DR. EUGENIA SORTO M.D.      CLINIC PHONE NUMBER:  [phone]  [facility]

## (undated) DEVICE — ENDO SNARE POLYPECTOMY OVAL 15MM LOOP SD-240U-15

## (undated) DEVICE — LINEN HALF SHEET 5512

## (undated) DEVICE — SUCTION IRR STRYKERFLOW II W/TIP 250-070-520

## (undated) DEVICE — GOWN XLG DISP 9545

## (undated) DEVICE — ENDO TROCAR BLUNT TIP KII BALLOON 12X100MM C0R47

## (undated) DEVICE — WIRE GUIDE 0.035"X450CM JAGWIRE HP STR TIP M00556580

## (undated) DEVICE — COVER FOOTSWITCH URO

## (undated) DEVICE — CLIP APPLIER ENDO 5MM M/L LIGAMAX EL5ML

## (undated) DEVICE — ESU GROUND PAD ADULT W/CORD E7507

## (undated) DEVICE — SOL NACL 0.9% INJ 250ML BAG 2B1322Q

## (undated) DEVICE — SOL NACL 0.9% IRRIG 1000ML BOTTLE 2F7124

## (undated) DEVICE — LINEN TOWEL PACK X10 5473

## (undated) DEVICE — PACK ERCP CUSTOM RIDGES

## (undated) DEVICE — SOL WATER IRRIG 1000ML BOTTLE 2F7114

## (undated) DEVICE — LINEN POUCH DBL 5427

## (undated) DEVICE — SUCTION MANIFOLD NEPTUNE 2 SYS 4 PORT 0702-020-000

## (undated) DEVICE — LINEN FULL SHEET 5511

## (undated) DEVICE — SINGLE USE DISTAL COVER

## (undated) DEVICE — SU VICRYL 0 UR-6 27" J603H

## (undated) DEVICE — GLOVE BIOGEL PI MICRO SZ 7.5 48575

## (undated) DEVICE — BALLOON EXTRACTION 3-LUMEN 15X190MM 2.8MM TL B-V233P-A

## (undated) DEVICE — KIT PROCEDURE W/CLEAN-A-SCOPE LINERS V2 200800

## (undated) DEVICE — ENDO TROCAR FIRST ENTRY KII FIOS ADV FIX 05X100MM CFF03

## (undated) DEVICE — SU DERMABOND ADVANCED .7ML DNX12

## (undated) DEVICE — BAG CLEAR TRASH 1.3M 39X33" P4040C

## (undated) DEVICE — INTRODUCER ENDOSCOPIC OASIS 10FRX205CM OA-10

## (undated) DEVICE — ESU CORD MONOPOLAR 10'  E0510

## (undated) DEVICE — PREP CHLORAPREP 26ML TINTED HI-LITE ORANGE 930815

## (undated) DEVICE — ENDO TROCAR SLEEVE KII Z-THREADED 05X100MM CTS02

## (undated) DEVICE — SU VICRYL 4-0 PS-2 18" UND J496H

## (undated) DEVICE — DRAIN JACKSON PRATT 15FR ROUND SIL LF JP-2229

## (undated) DEVICE — GLOVE BIOGEL PI MICRO INDICATOR UNDERGLOVE SZ 8.0 48980

## (undated) DEVICE — DECANTER BAG 2002S

## (undated) DEVICE — SU ETHILON 2-0 PS 18" 585H

## (undated) DEVICE — TAPE CLOTH ADHESIVE 3" LATEX FREE

## (undated) DEVICE — RAD RX ISOVUE 300 (50ML) 61% IOPAMIDOL CHARGE PER ML

## (undated) DEVICE — SPHINCTEROTOME 7FRX25MM TRITOME G22555

## (undated) DEVICE — Device

## (undated) DEVICE — BLADE KNIFE SURG 11 371111

## (undated) DEVICE — ENDO SPONGE ENDOSTICK KITTNER 5MM CHERRY 37002010

## (undated) DEVICE — DRAIN JACKSON PRATT RESERVOIR 100ML SU130-1305

## (undated) DEVICE — ENDO POUCH UNIV RETRIEVAL SYSTEM INZII 10MM CD001

## (undated) DEVICE — SUCTION CANISTER MEDIVAC LINER 3000ML W/LID 65651-530

## (undated) RX ORDER — ONDANSETRON 2 MG/ML
INJECTION INTRAMUSCULAR; INTRAVENOUS
Status: DISPENSED
Start: 2024-01-01

## (undated) RX ORDER — PROPOFOL 10 MG/ML
INJECTION, EMULSION INTRAVENOUS
Status: DISPENSED
Start: 2024-01-01

## (undated) RX ORDER — GLYCOPYRROLATE 0.2 MG/ML
INJECTION, SOLUTION INTRAMUSCULAR; INTRAVENOUS
Status: DISPENSED
Start: 2024-02-12

## (undated) RX ORDER — FENTANYL CITRATE 50 UG/ML
INJECTION, SOLUTION INTRAMUSCULAR; INTRAVENOUS
Status: DISPENSED
Start: 2024-01-01

## (undated) RX ORDER — PROPOFOL 10 MG/ML
INJECTION, EMULSION INTRAVENOUS
Status: DISPENSED
Start: 2024-02-12

## (undated) RX ORDER — FENTANYL CITRATE 50 UG/ML
INJECTION, SOLUTION INTRAMUSCULAR; INTRAVENOUS
Status: DISPENSED
Start: 2023-12-28

## (undated) RX ORDER — LIDOCAINE HYDROCHLORIDE 10 MG/ML
INJECTION, SOLUTION EPIDURAL; INFILTRATION; INTRACAUDAL; PERINEURAL
Status: DISPENSED
Start: 2024-02-12

## (undated) RX ORDER — DEXAMETHASONE SODIUM PHOSPHATE 4 MG/ML
INJECTION, SOLUTION INTRA-ARTICULAR; INTRALESIONAL; INTRAMUSCULAR; INTRAVENOUS; SOFT TISSUE
Status: DISPENSED
Start: 2024-01-01

## (undated) RX ORDER — LABETALOL HYDROCHLORIDE 5 MG/ML
INJECTION, SOLUTION INTRAVENOUS
Status: DISPENSED
Start: 2024-01-01

## (undated) RX ORDER — INDOCYANINE GREEN AND WATER 25 MG
KIT INJECTION
Status: DISPENSED
Start: 2024-01-01

## (undated) RX ORDER — LIDOCAINE HYDROCHLORIDE 10 MG/ML
INJECTION, SOLUTION EPIDURAL; INFILTRATION; INTRACAUDAL; PERINEURAL
Status: DISPENSED
Start: 2024-01-01

## (undated) RX ORDER — FENTANYL CITRATE 50 UG/ML
INJECTION, SOLUTION INTRAMUSCULAR; INTRAVENOUS
Status: DISPENSED
Start: 2024-02-12

## (undated) RX ORDER — INDOMETHACIN 50 MG/1
SUPPOSITORY RECTAL
Status: DISPENSED
Start: 2024-02-12

## (undated) RX ORDER — DEXAMETHASONE SODIUM PHOSPHATE 4 MG/ML
INJECTION, SOLUTION INTRA-ARTICULAR; INTRALESIONAL; INTRAMUSCULAR; INTRAVENOUS; SOFT TISSUE
Status: DISPENSED
Start: 2024-02-12

## (undated) RX ORDER — ETOMIDATE 2 MG/ML
INJECTION INTRAVENOUS
Status: DISPENSED
Start: 2023-12-28

## (undated) RX ORDER — ONDANSETRON 2 MG/ML
INJECTION INTRAMUSCULAR; INTRAVENOUS
Status: DISPENSED
Start: 2024-02-12

## (undated) RX ORDER — BUPIVACAINE HYDROCHLORIDE 5 MG/ML
INJECTION, SOLUTION EPIDURAL; INTRACAUDAL
Status: DISPENSED
Start: 2024-01-01

## (undated) RX ORDER — GLYCOPYRROLATE 0.2 MG/ML
INJECTION INTRAMUSCULAR; INTRAVENOUS
Status: DISPENSED
Start: 2024-01-01